# Patient Record
Sex: FEMALE | Race: WHITE | NOT HISPANIC OR LATINO | Employment: OTHER | URBAN - METROPOLITAN AREA
[De-identification: names, ages, dates, MRNs, and addresses within clinical notes are randomized per-mention and may not be internally consistent; named-entity substitution may affect disease eponyms.]

---

## 2017-12-21 ENCOUNTER — TRANSCRIBE ORDERS (OUTPATIENT)
Dept: OBSTETRICS AND GYNECOLOGY | Facility: CLINIC | Age: 67
End: 2017-12-21

## 2017-12-21 DIAGNOSIS — Z12.31 VISIT FOR SCREENING MAMMOGRAM: Primary | ICD-10-CM

## 2018-01-22 ENCOUNTER — HOSPITAL ENCOUNTER (OUTPATIENT)
Dept: MAMMOGRAPHY | Facility: HOSPITAL | Age: 68
Discharge: HOME | End: 2018-01-22
Attending: OBSTETRICS & GYNECOLOGY

## 2018-02-08 ENCOUNTER — HOSPITAL ENCOUNTER (OUTPATIENT)
Dept: MAMMOGRAPHY | Facility: HOSPITAL | Age: 68
Discharge: HOME | End: 2018-02-08
Attending: OBSTETRICS & GYNECOLOGY | Admitting: OBSTETRICS & GYNECOLOGY

## 2018-02-08 DIAGNOSIS — Z12.31 VISIT FOR SCREENING MAMMOGRAM: ICD-10-CM

## 2018-02-08 PROCEDURE — 77063 BREAST TOMOSYNTHESIS BI: CPT | Performed by: RADIOLOGY

## 2018-02-08 PROCEDURE — 77067 SCR MAMMO BI INCL CAD: CPT | Performed by: RADIOLOGY

## 2018-02-08 PROCEDURE — 77067 SCR MAMMO BI INCL CAD: CPT

## 2018-02-08 PROCEDURE — 77063 BREAST TOMOSYNTHESIS BI: CPT

## 2018-02-12 ENCOUNTER — HOSPITAL ENCOUNTER (OUTPATIENT)
Dept: MAMMOGRAPHY | Facility: HOSPITAL | Age: 68
Discharge: HOME | End: 2018-02-12
Admitting: OBSTETRICS & GYNECOLOGY

## 2018-02-12 ENCOUNTER — TRANSCRIBE ORDERS (OUTPATIENT)
Dept: MAMMOGRAPHY | Facility: HOSPITAL | Age: 68
End: 2018-02-12

## 2018-02-12 DIAGNOSIS — R92.8 ABNORMAL MAMMOGRAM: Primary | ICD-10-CM

## 2018-02-12 DIAGNOSIS — R92.8 ABNORMAL MAMMOGRAM: ICD-10-CM

## 2018-02-12 PROCEDURE — 77065 DX MAMMO INCL CAD UNI: CPT | Performed by: RADIOLOGY

## 2018-02-12 PROCEDURE — G0279 TOMOSYNTHESIS, MAMMO: HCPCS | Performed by: RADIOLOGY

## 2018-02-12 PROCEDURE — 77065 DX MAMMO INCL CAD UNI: CPT

## 2018-02-12 PROCEDURE — G0279 TOMOSYNTHESIS, MAMMO: HCPCS

## 2018-04-03 ENCOUNTER — OFFICE VISIT (OUTPATIENT)
Dept: OBSTETRICS AND GYNECOLOGY | Facility: CLINIC | Age: 68
End: 2018-04-03

## 2018-04-03 VITALS
SYSTOLIC BLOOD PRESSURE: 116 MMHG | BODY MASS INDEX: 31.41 KG/M2 | HEIGHT: 60 IN | WEIGHT: 160 LBS | DIASTOLIC BLOOD PRESSURE: 70 MMHG

## 2018-04-03 DIAGNOSIS — Z01.419 WOMEN'S ANNUAL ROUTINE GYNECOLOGICAL EXAMINATION: Primary | ICD-10-CM

## 2018-04-03 PROBLEM — Z98.890 HISTORY OF BACK SURGERY: Status: ACTIVE | Noted: 2018-04-03

## 2018-04-03 PROBLEM — Z82.62 FAMILY HISTORY OF OSTEOPOROSIS IN MOTHER: Status: ACTIVE | Noted: 2018-04-03

## 2018-04-03 PROBLEM — Z80.3 FAMILY HISTORY OF BREAST CANCER IN SISTER: Status: ACTIVE | Noted: 2018-04-03

## 2018-04-03 PROBLEM — Z90.710 HX OF ABDOMINAL HYSTERECTOMY: Status: ACTIVE | Noted: 2018-04-03

## 2018-04-03 PROBLEM — Z80.41 FAMILY HISTORY OF OVARIAN CANCER: Status: ACTIVE | Noted: 2018-04-03

## 2018-04-03 PROCEDURE — G0328 FECAL BLOOD SCRN IMMUNOASSAY: HCPCS | Performed by: OBSTETRICS & GYNECOLOGY

## 2018-04-03 PROCEDURE — G0101 CA SCREEN;PELVIC/BREAST EXAM: HCPCS | Performed by: OBSTETRICS & GYNECOLOGY

## 2018-04-03 RX ORDER — BUPROPION HYDROCHLORIDE 150 MG/1
150 TABLET ORAL DAILY
COMMUNITY
End: 2019-04-04

## 2018-04-03 RX ORDER — BISOPROLOL FUMARATE AND HYDROCHLOROTHIAZIDE 5; 6.25 MG/1; MG/1
TABLET ORAL
COMMUNITY
Start: 2018-02-14

## 2018-04-03 RX ORDER — LISINOPRIL 10 MG/1
TABLET ORAL
COMMUNITY
Start: 2018-02-14

## 2018-04-03 NOTE — PROGRESS NOTES
Subjective   Chief Complaint   Patient presents with   • Annual Exam     MARLYS Nassar is a 67 y.o. year old  menopausal female presenting to be seen for her annual exam.  There has not been vaginal bleeding in the last 12 months.  Hot flashes and night sweats are not a significant problem.  She seen Dr. Hoover in the past.  Her sister had breast and ovarian cancer in a proxy .  Kayleen underwent a complete hysterectomy in .  More recently she's had some pain in the lower abdomen thought was kidney stone because it passed almost immediately.  No blood in her urine.  She says both ovaries were removed.  Also issue with her breast on screening mammogram I reviewed this it showed an asymmetry follow-up showed the asymmetry improved.  She wonders if she still needs to go back for diagnostic mammogram.  In my opinion with asymmetry improving I would be less conservative than that mammograms center where they will check him back every 6 months for 2 years.  I personally think that of her mammogram does not change in 6 months that she could do yearly mammogram screening along with self breast examination.  She wants a good breast examination today.  SEXUAL Hx:  She is sexually active.  Vaginal dryness is a problem.She uses over-the-counter lubrication.   51 years.    HEALTH Hx:  She exercises regularly: yes.  She wears her seat belt:yes.  She has concerns about domestic violence: no.  She has noticed changes in height: no.              Calcium intake is not adequate she only drinks 1 serving of milk and 1 Green a day.  Her mother had osteoporosis was really bent over.  She reports [facility] in practice and will try to get those results.      The following portions of the patient's history were reviewed and updated as appropriate:problem list, current medications, allergies, past family history, past medical history, past  "social history and past surgical history.    Smoking status: Never Smoker                                                              Smokeless tobacco: Never Used                        Review of Systems   Her bowels and bladder are normal     Objective   /70   Ht 151.1 cm (59.5\")   Wt 72.6 kg (160 lb)   BMI 31.78 kg/m²      General:  well developed; well nourished  no acute distress  appears stated age   Skin:  No suspicious lesions seen   Thyroid: not examined   Breasts:  Examined in supine position  Symmetric without masses or skin dimpling  Nipples normal without inversion, lesions or discharge  There are no palpable axillary nodes   Abdomen: no umbilical or inginual hernias are present  no hepato-splenomegaly  Minimal tenderness lower abdomen near midline scar   Pelvis: External genitalia normal bimanual examination good support anteriorly i and the cuff no masses palpated nontender   Rectovaginal confirms above Hemoccult is negative         Assessment   1. Normal GYN examination status post hysterectomy with BSO   2. Abnormal mammogram only showing asymmetry which essentially resolved with spot compression views.  I withdrew the patient that it would be okay to return to us for her annual screening.  I warned her though at [facility] would require a diagnostic mammogram if she goes over the 6 month recommended timeframe.  3. Possible osteopenia.  Family history of osteoporosis in her mother     Plan   1. Calcium discussed  1200 mg daily in divided doses ideally in diet  2. Regular weight bearing exercise  3. Breast self awareness, mammograms discussed  4. Find paper chart and review DEXA scans to determine if osteopenia versus osteoporosis      New Medications Ordered This Visit   Medications   • bisoprolol-hydrochlorothiazide (ZIAC) 5-6.25 MG per tablet   • lisinopril (PRINIVIL,ZESTRIL) 10 MG tablet   • buPROPion XL (WELLBUTRIN XL) 150 MG 24 hr tablet     Sig: Take 150 mg by mouth Daily.       "     This note was electronically signed.    Amado Andrade M.D.  April 3, 2018

## 2018-04-17 PROBLEM — M85.89 OSTEOPENIA OF MULTIPLE SITES: Status: ACTIVE | Noted: 2018-04-17

## 2018-04-17 PROBLEM — D12.6 ADENOMATOUS POLYP OF COLON: Status: ACTIVE | Noted: 2018-04-17

## 2018-04-27 PROBLEM — M85.80 OSTEOPENIA DETERMINED BY X-RAY: Status: ACTIVE | Noted: 2018-04-27

## 2018-04-30 ENCOUNTER — TELEPHONE (OUTPATIENT)
Dept: OBSTETRICS AND GYNECOLOGY | Facility: CLINIC | Age: 68
End: 2018-04-30

## 2018-04-30 DIAGNOSIS — M85.89 OSTEOPENIA OF MULTIPLE SITES: Primary | ICD-10-CM

## 2018-04-30 NOTE — TELEPHONE ENCOUNTER
I left a message for Kayleen that I would like to order a bone density study follow-up.  I [facility], [facility] at 09.  This showed osteopenia.  She apparently was on Fosamax and discontinue this for Boniva because of high blood pressure.  I've been only medication in a while and would like to make sure that she is not regressing.  She had shown improvement on treatment.  We'll scan the third DEXA with inclusion of 05 & 03 results into media section

## 2018-08-14 ENCOUNTER — HOSPITAL ENCOUNTER (OUTPATIENT)
Dept: ULTRASOUND IMAGING | Facility: HOSPITAL | Age: 68
Discharge: HOME | End: 2018-08-14

## 2018-08-14 ENCOUNTER — HOSPITAL ENCOUNTER (OUTPATIENT)
Dept: MAMMOGRAPHY | Facility: HOSPITAL | Age: 68
Discharge: HOME | End: 2018-08-14

## 2018-08-14 ENCOUNTER — HOSPITAL ENCOUNTER (OUTPATIENT)
Dept: MAMMOGRAPHY | Facility: HOSPITAL | Age: 68
Discharge: HOME | End: 2018-08-14
Attending: OBSTETRICS & GYNECOLOGY | Admitting: OBSTETRICS & GYNECOLOGY

## 2018-08-14 DIAGNOSIS — R92.8 ABNORMAL MAMMOGRAM: ICD-10-CM

## 2018-08-14 PROCEDURE — A4648 IMPLANTABLE TISSUE MARKER: HCPCS

## 2018-08-14 PROCEDURE — 76642 ULTRASOUND BREAST LIMITED: CPT

## 2018-08-14 PROCEDURE — 77065 DX MAMMO INCL CAD UNI: CPT | Performed by: RADIOLOGY

## 2018-08-14 PROCEDURE — 76642 ULTRASOUND BREAST LIMITED: CPT | Performed by: RADIOLOGY

## 2018-08-14 PROCEDURE — 19083 BX BREAST 1ST LESION US IMAG: CPT | Performed by: RADIOLOGY

## 2018-08-14 PROCEDURE — 88305 TISSUE EXAM BY PATHOLOGIST: CPT | Performed by: OBSTETRICS & GYNECOLOGY

## 2018-08-14 PROCEDURE — 77065 DX MAMMO INCL CAD UNI: CPT

## 2018-08-14 RX ORDER — LIDOCAINE HYDROCHLORIDE AND EPINEPHRINE 10; 10 MG/ML; UG/ML
5 INJECTION, SOLUTION INFILTRATION; PERINEURAL ONCE
Status: DISCONTINUED | OUTPATIENT
Start: 2018-08-14 | End: 2018-09-12

## 2018-08-14 RX ORDER — LIDOCAINE HYDROCHLORIDE 10 MG/ML
5 INJECTION, SOLUTION INFILTRATION; PERINEURAL ONCE
Status: COMPLETED | OUTPATIENT
Start: 2018-08-14 | End: 2018-08-14

## 2018-08-14 RX ADMIN — LIDOCAINE HYDROCHLORIDE 5 ML: 10 INJECTION, SOLUTION INFILTRATION; PERINEURAL at 12:05

## 2018-08-15 LAB
CYTO UR: NORMAL
LAB AP CASE REPORT: NORMAL
LAB AP CLINICAL INFORMATION: NORMAL
LAB AP DIAGNOSIS COMMENT: NORMAL
PATH REPORT.FINAL DX SPEC: NORMAL
PATH REPORT.GROSS SPEC: NORMAL

## 2018-08-20 ENCOUNTER — TRANSCRIBE ORDERS (OUTPATIENT)
Dept: MAMMOGRAPHY | Facility: HOSPITAL | Age: 68
End: 2018-08-20

## 2018-08-20 DIAGNOSIS — R92.8 ABNORMAL MAMMOGRAM: Primary | ICD-10-CM

## 2018-09-12 ENCOUNTER — HOSPITAL ENCOUNTER (OUTPATIENT)
Dept: MAMMOGRAPHY | Facility: HOSPITAL | Age: 68
Discharge: HOME | End: 2018-09-12

## 2018-09-12 ENCOUNTER — HOSPITAL ENCOUNTER (OUTPATIENT)
Dept: MAMMOGRAPHY | Facility: HOSPITAL | Age: 68
Discharge: HOME | End: 2018-09-12
Admitting: RADIOLOGY

## 2018-09-12 DIAGNOSIS — R92.8 ABNORMAL MAMMOGRAM: ICD-10-CM

## 2018-09-12 PROCEDURE — A4648 IMPLANTABLE TISSUE MARKER: HCPCS

## 2018-09-12 PROCEDURE — 19081 BX BREAST 1ST LESION STRTCTC: CPT | Performed by: RADIOLOGY

## 2018-09-12 PROCEDURE — 88360 TUMOR IMMUNOHISTOCHEM/MANUAL: CPT | Performed by: OBSTETRICS & GYNECOLOGY

## 2018-09-12 PROCEDURE — 88305 TISSUE EXAM BY PATHOLOGIST: CPT | Performed by: OBSTETRICS & GYNECOLOGY

## 2018-09-12 PROCEDURE — 77065 DX MAMMO INCL CAD UNI: CPT | Performed by: RADIOLOGY

## 2018-09-12 RX ORDER — LIDOCAINE HYDROCHLORIDE 10 MG/ML
5 INJECTION, SOLUTION INFILTRATION; PERINEURAL ONCE
Status: COMPLETED | OUTPATIENT
Start: 2018-09-12 | End: 2018-09-12

## 2018-09-12 RX ORDER — LIDOCAINE HYDROCHLORIDE AND EPINEPHRINE 10; 10 MG/ML; UG/ML
10 INJECTION, SOLUTION INFILTRATION; PERINEURAL ONCE
Status: COMPLETED | OUTPATIENT
Start: 2018-09-12 | End: 2018-09-12

## 2018-09-12 RX ADMIN — LIDOCAINE HYDROCHLORIDE,EPINEPHRINE BITARTRATE 18 ML: 10; .01 INJECTION, SOLUTION INFILTRATION; PERINEURAL at 09:46

## 2018-09-12 RX ADMIN — LIDOCAINE HYDROCHLORIDE 5 ML: 10 INJECTION, SOLUTION INFILTRATION; PERINEURAL at 09:46

## 2018-09-14 LAB
CYTO UR: NORMAL
LAB AP CASE REPORT: NORMAL
LAB AP CLINICAL INFORMATION: NORMAL
LAB AP DIAGNOSIS COMMENT: NORMAL
LAB AP FLOW CYTOMETRY SUMMARY: NORMAL
LAB AP SPECIAL STAINS: NORMAL
PATH REPORT.FINAL DX SPEC: NORMAL
PATH REPORT.GROSS SPEC: NORMAL

## 2018-09-17 ENCOUNTER — TELEPHONE (OUTPATIENT)
Dept: MAMMOGRAPHY | Facility: HOSPITAL | Age: 68
End: 2018-09-17

## 2018-09-17 ENCOUNTER — TELEPHONE (OUTPATIENT)
Dept: OBSTETRICS AND GYNECOLOGY | Facility: CLINIC | Age: 68
End: 2018-09-17

## 2018-09-17 ENCOUNTER — DOCUMENTATION (OUTPATIENT)
Dept: OBSTETRICS AND GYNECOLOGY | Facility: CLINIC | Age: 68
End: 2018-09-17

## 2018-09-17 NOTE — PROGRESS NOTES
I called Kayleen this morning having thought that the radiology Department may have told her about her path report from Wednesday today being Monday the .  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  She wanted to know the size and I've stated that the size on the ultrasound and mammograms just under 1 cm.  2 and half centimeters ring 1 inch.  Suggested that she see general surgery.  Gave her the name of Dr. Jung toussaint and office #[id number].  She wondered what would happen if she didn't do anything.  She had a sister who was diagnosed with breast cancer at age 50 and had multiple surgeries and  at age 53.  She questioned her sister's quality of life during that treatment.  I discussed that all cancers are not the same and her sister may have had a different diagnosis or more aggressive cancer.  Did briefly discuss at her cancer is estrogen and progesterone positive so antiestrogen medications could be prescribed.  Strongly suggested that she make an appointment to discuss her options with Gen. surgery.  Told her to give me a call back if she had any questions.  Dr. Andrade

## 2018-09-17 NOTE — TELEPHONE ENCOUNTER
Referring provider's office notified pathology returned as cancer & patient will be notified. Pt notified of pathology results and recommendation. Verbalizes understanding. Denies discomfort. Denies any signs and symptoms of infection. Patient desires Dr MARTELL Snyder for surgical consult. Patient will be notified of appointment.  Patient was encouraged to call back with any questions or concerns. Patient verbalized understanding. Breast cancer information packet offered and accepted.

## 2018-09-17 NOTE — PROGRESS NOTES
Sorry about Dragon dictation  as well -that's stepping in front of you.  I thought maybe you got in touch with  last week

## 2018-09-17 NOTE — PROGRESS NOTES
I apologize for stepping in front of view.  I thought that maybe it got in touch with Kayleen last week.  I relayed the diagnosis to her and she was thankful that I told her.  I've left and suggested she call Dr. Snyder's office to see him or one of his partners for options.

## 2018-10-08 ENCOUNTER — CLINICAL SUPPORT (OUTPATIENT)
Dept: GENETICS | Facility: HOSPITAL | Age: 68
End: 2018-10-08

## 2018-10-08 ENCOUNTER — APPOINTMENT (OUTPATIENT)
Dept: LAB | Facility: HOSPITAL | Age: 68
End: 2018-10-08

## 2018-10-08 DIAGNOSIS — Z80.49 FAMILY HISTORY OF UTERINE CANCER: ICD-10-CM

## 2018-10-08 DIAGNOSIS — D05.12 DUCTAL CARCINOMA IN SITU (DCIS) OF LEFT BREAST: Primary | ICD-10-CM

## 2018-10-08 DIAGNOSIS — Z80.3 FAMILY HISTORY OF BREAST CANCER: ICD-10-CM

## 2018-10-08 DIAGNOSIS — Z80.0 FAMILY HISTORY OF COLON CANCER: ICD-10-CM

## 2018-10-08 DIAGNOSIS — Z80.42 FAMILY HX OF PROSTATE CANCER: ICD-10-CM

## 2018-10-08 DIAGNOSIS — Z13.79 GENETIC TESTING: Primary | ICD-10-CM

## 2018-10-08 PROCEDURE — 96040: CPT | Performed by: GENETIC COUNSELOR, MS

## 2018-10-10 NOTE — PROGRESS NOTES
Kayleen Nassar is a 68-year old female who was seen for genetic counseling due to a personal history of breast cancer and family history of cancer. 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 She is in the process of making a surgical decision. She has had a hysterectomy but her ovaries are still intact. Ms. Nassar was interested in discussing her risk for a hereditary cancer syndrome, and decided to pursue genetic testing.   Ms. Nassar opted to pursue comprehensive testing via the CancerNext panel ordered through [employer] which includes 34 genes that are known to increase the risk of breast cancer and other cancers (genes on this panel include APC, NATALIE, BARD1, BMPR1A, BRCA1, BRCA2, BRIP1, CDH1, CDK4, CDKN2A, CHEK2, EPCAM, GREM1, MLH1, MRE11A, MSH2, MSH6, MUTYH, NBN, NF1, PALB2, PMS2, POLD1, POLE, PTEN, RAD50, RAD51C, RAD51D, SMAD4, SMARCA4, STK11, and TP53). Results are expected in 2-3 weeks.    PERTINENT FAMILY HISTORY: (See pedigree)   Sister:   Breast cancer (bilateral), 50     Uterine cancer, 53  Pat. Uncle:  Colon cancer, 70s  Pat. Aunt:  Breast cancer, 30s  Pat. Aunt:  Small bowel cancer, 60s  Pat. Cousin (x2): Breast cancer, 60s  Pat. Cousin (x2): Prostate cancer, 60s  Pat. Cousin:  Ovarian cancer, 69  Mat. Uncle:  Colon cancer, 80  Mat. Cousin (x2): Colon cancer, 60s  Mat. Aunt:  Colon cancer, 70  Mat. Grandfather: Colon cancer      RISK ASSESSMENT:  Ms. Nassar’s personal history of breast cancer in addition to the significant family history of colon and breast cancer raises the question of a hereditary cancer syndrome. We discussed BRCA1/2 testing as well as the option of pursuing a panel that would test for other genes known to impact breast cancer risk in addition to BRCA1/2.  We also discussed more comprehensive panels available based on the significant history of colon cancer in the family. This risk assessment is based on the family history information provided at the time of the  appointment.  The assessment could change in the future should new information be obtained.    GENETIC COUNSELING (40 minutes): We reviewed the family history information in detail.  Cases of breast cancer follow three general patterns: sporadic, familial, and hereditary. While most cancer is sporadic, some cases appear to occur in family clusters.  These cases are said to be familial and account for 10-20% of breast cancer   cases. Familial cases may be due to a combination of shared genes and environmental factors among family members. In even fewer families, the cancer is said to be inherited, and the genes responsible for the cancer are known.      Family histories typical of hereditary cancer syndromes usually include multiple first- and second-degree relatives diagnosed with cancer types that define a syndrome. These cases tend to be diagnosed at younger-than-expected ages and can be bilateral or multifocal. The cancer in these families follows an autosomal dominant inheritance pattern, which indicates the likely presence of a mutation in a cancer susceptibility gene. Children and siblings of an individual believed to carry this mutation have a 50% chance of inheriting that mutation, thereby inheriting the increased risk to develop cancer.  These mutations can be passed down from the maternal or the paternal lineage.    Hereditary breast cancer accounts for 5-10% of all cases of breast cancer.  A significant proportion of hereditary breast cancer can be attributed to mutations in the BRCA1 and BRCA2 genes.  Mutations in these genes confer an increased risk for breast cancer, ovarian cancer, male breast cancer, prostate cancer and pancreatic cancer.  Women with a BRCA1 or BRCA2 mutation who have already been diagnosed with breast cancer have a 40-60% lifetime risk of a second breast cancer.     There are other genes that are known to be associated with an increased risk for cancer.  Some of these genes have  well defined cancer risks and established management guidelines.  Other genes that can be tested for have been more recently described, and there may be less data regarding the risks and therefore may not have established management guidelines.  Based on Ms. Nassar’s desire to get as much information as possible regarding her personal risks and potential risks for her family, she opted to pursue testing through a panel that would look at 34 genes known to increase the risk for breast cancer and other cancers.    Genetic Testing:  The risks, benefits and limitations of genetic testing and implications for clinical management following testing were reviewed.  DNA test results can influence decisions regarding screening, prevention and surgical management.  Genetic testing can have significant psychological implications for both individuals and families.  Also discussed was the possibility of employment and insurance discrimination based on genetic test results and the laws in place to prevent this ([place]IL), as well as the limitations of these laws.    We discussed panel testing, which would involve testing for BRCA1/2 as well as 32 other genes at the same time (APC, NATALIE, BARD1, BMPR1A, BRCA1, BRCA2, BRIP1, CDH1, CDK4, CDKN2A, CHEK2, EPCAM, GREM1, MLH1, MRE11A, MSH2, MSH6, MUTYH, NBN, NF1, PALB2, PMS2, POLD1, POLE, PTEN, RAD50, RAD51C, RAD51D, SMAD4, SMARCA4, STK11, and TP53). The benefits and limitations of genetic testing were discussed and Ms. Nassar decided to pursue testing via the panel. The implications of a positive or negative test result were discussed. We discussed the possibility that, in some cases, genetic test results may be ambiguous due to the identification of a genetic variant. These variants may or may not be associated with an increased cancer risk. Given her personal history, a negative test result does not eliminate all breast cancer risk to her relatives, although the risk would not be as high  as it would with positive genetic testing.        PLAN: Genetic testing should be complete in 2-3 weeks, and Ms. Nassar will be contacted by telephone to discuss the results. In the meantime Ms. Nassar is welcome to contact us at [phone] with any questions she may have.        Nicolette Arellano MS, [facility] [place] [facility]       Licensed Certified Genetic Counselor

## 2018-10-16 ENCOUNTER — DOCUMENTATION (OUTPATIENT)
Dept: GENETICS | Facility: HOSPITAL | Age: 68
End: 2018-10-16

## 2018-10-16 NOTE — PROGRESS NOTES
Kayleen Nassar is a 68-year old female who was seen for genetic counseling due to a personal history of breast cancer and family history of cancer. 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 She is in the process of making a surgical decision. She has had a hysterectomy but her ovaries are still intact. Ms. Nassar was interested in discussing her risk for a hereditary cancer syndrome, and decided to pursue genetic testing.   Ms. Nassar opted to pursue comprehensive testing via the CancerNext panel ordered through [employer] which includes 34 genes that are known to increase the risk of breast cancer and other cancers (genes on this panel include APC, NATALIE, BARD1, BMPR1A, BRCA1, BRCA2, BRIP1, CDH1, CDK4, CDKN2A, CHEK2, EPCAM, GREM1, MLH1, MRE11A, MSH2, MSH6, MUTYH, NBN, NF1, PALB2, PMS2, POLD1, POLE, PTEN, RAD50, RAD51C, RAD51D, SMAD4, SMARCA4, STK11, and TP53). Genetic testing was negative for mutations in BRCA1/2 and all 32 additional genes on the CancerNext Panel.  Ms. Nassar was contacted with these normal results by telephone on 10/16/18.    PERTINENT FAMILY HISTORY: (See pedigree)   Sister:   Breast cancer (bilateral), 50     Uterine cancer, 53  Pat. Uncle:  Colon cancer, 70s  Pat. Aunt:  Breast cancer, 30s  Pat. Aunt:  Small bowel cancer, 60s  Pat. Cousin (x2): Breast cancer, 60s  Pat. Cousin (x2): Prostate cancer, 60s  Pat. Cousin:  Ovarian cancer, 69  Mat. Uncle:  Colon cancer, 80  Mat. Cousin (x2): Colon cancer, 60s  Mat. Aunt:  Colon cancer, 70  Mat. Grandfather: Colon cancer      RISK ASSESSMENT:  Ms. Nassar’s personal history of breast cancer in addition to the significant family history of colon and breast cancer raises the question of a hereditary cancer syndrome. We discussed BRCA1/2 testing as well as the option of pursuing a panel that would test for other genes known to impact breast cancer risk in addition to BRCA1/2.  We also discussed more comprehensive panels available based on the  significant history of colon cancer in the family. This risk assessment is based on the family history information provided at the time of the appointment.  The assessment could change in the future should new information be obtained.    GENETIC COUNSELING: We reviewed the family history information in detail.  Cases of breast cancer follow three general patterns: sporadic, familial, and hereditary. While most cancer is sporadic, some cases appear to occur in family clusters.  These cases are said to be familial and account for 10-20% of breast cancer   cases. Familial cases may be due to a combination of shared genes and environmental factors among family members. In even fewer families, the cancer is said to be inherited, and the genes responsible for the cancer are known.      Family histories typical of hereditary cancer syndromes usually include multiple first- and second-degree relatives diagnosed with cancer types that define a syndrome. These cases tend to be diagnosed at younger-than-expected ages and can be bilateral or multifocal. The cancer in these families follows an autosomal dominant inheritance pattern, which indicates the likely presence of a mutation in a cancer susceptibility gene. Children and siblings of an individual believed to carry this mutation have a 50% chance of inheriting that mutation, thereby inheriting the increased risk to develop cancer.  These mutations can be passed down from the maternal or the paternal lineage.    Hereditary breast cancer accounts for 5-10% of all cases of breast cancer.  A significant proportion of hereditary breast cancer can be attributed to mutations in the BRCA1 and BRCA2 genes.  Mutations in these genes confer an increased risk for breast cancer, ovarian cancer, male breast cancer, prostate cancer and pancreatic cancer.  Women with a BRCA1 or BRCA2 mutation who have already been diagnosed with breast cancer have a 40-60% lifetime risk of a second breast  cancer.     There are other genes that are known to be associated with an increased risk for cancer.  Some of these genes have well defined cancer risks and established management guidelines.  Other genes that can be tested for have been more recently described, and there may be less data regarding the risks and therefore may not have established management guidelines.  Based on Ms. Nassar’s desire to get as much information as possible regarding her personal risks and potential risks for her family, she opted to pursue testing through a panel that would look at 34 genes known to increase the risk for breast cancer and other cancers.    Genetic Testing:  The risks, benefits and limitations of genetic testing and implications for clinical management following testing were reviewed.  DNA test results can influence decisions regarding screening, prevention and surgical management.  Genetic testing can have significant psychological implications for both individuals and families.  Also discussed was the possibility of employment and insurance discrimination based on genetic test results and the laws in place to prevent this (ONEIL), as well as the limitations of these laws.    We discussed panel testing, which would involve testing for BRCA1/2 as well as 32 other genes at the same time (APC, NATALIE, BARD1, BMPR1A, BRCA1, BRCA2, BRIP1, CDH1, CDK4, CDKN2A, CHEK2, EPCAM, GREM1, MLH1, MRE11A, MSH2, MSH6, MUTYH, NBN, NF1, PALB2, PMS2, POLD1, POLE, PTEN, RAD50, RAD51C, RAD51D, SMAD4, SMARCA4, STK11, and TP53). The benefits and limitations of genetic testing were discussed and Ms. Nassar decided to pursue testing via the panel. The implications of a positive or negative test result were discussed. We discussed the possibility that, in some cases, genetic test results may be ambiguous due to the identification of a genetic variant. These variants may or may not be associated with an increased cancer risk. Given her personal  history, a negative test result does not eliminate all breast cancer risk to her relatives, although the risk would not be as high as it would with positive genetic testing.      TEST RESULTS:  Genetic testing was negative by sequencing and deletion/duplication testing for mutations in BRCA1/2 and the additional 32 genes on the CancerNext panel.   The negative result greatly lowers the risk of a hereditary cancer syndrome.   Ms. Nassar’s relatives may still be considered to have a somewhat increased risk for breast cancer based on the family history.  This assessment is based on the information provided at the time of the consultation.    Cancer Prevention:  Despite the negative genetic test results, Ms. Nassar’s female relatives may have a somewhat increased lifetime risk for breast cancer based on family history.  Given the increased risk, options available to individuals with a high lifetime risk for breast cancer were briefly discussed.  This includes increased surveillance and chemoprevention.     Surveillance for individuals with a high lifetime risk of breast cancer (>20%, versus the average risk of 12%), based on NCCN guidelines, would consist of semi-annual clinical breast exams and monthly self-breast exams starting by age 18 and annual mammography starting 10 years younger than the earliest diagnosis in a close relative, or starting by age 40.  According to an American Cancer Society expert panel, annual breast MRI should be offered to women whose lifetime risk of breast cancer is 20-25 percent or more, also starting by age 40 or earlier if indicated by family history.  Female relatives could have a risk assessment performed using a family history-based model, such as the Tyrer-Cuzick model, to determine their individual risks.      PLAN:  Genetic counseling remains available for Ms. Nassar.  If Ms. Nassar has any questions or concerns she is welcome to call us at [phone].    Nicolette Arellano MS,  [facility] [place] [facility]       Licensed Certified Genetic Counselor          Cc: Kayleen Snyder MD

## 2018-10-31 ENCOUNTER — TRANSCRIBE ORDERS (OUTPATIENT)
Dept: MAMMOGRAPHY | Facility: HOSPITAL | Age: 68
End: 2018-10-31

## 2018-10-31 DIAGNOSIS — D05.12 DUCTAL CARCINOMA IN SITU OF LEFT BREAST: Primary | ICD-10-CM

## 2018-11-07 ENCOUNTER — APPOINTMENT (OUTPATIENT)
Dept: PREADMISSION TESTING | Facility: HOSPITAL | Age: 68
End: 2018-11-07

## 2018-11-07 LAB
ANION GAP SERPL CALCULATED.3IONS-SCNC: 9 MMOL/L (ref 3–11)
BUN BLD-MCNC: 24 MG/DL (ref 9–23)
BUN/CREAT SERPL: 20.2 (ref 7–25)
CALCIUM SPEC-SCNC: 9.4 MG/DL (ref 8.7–10.4)
CHLORIDE SERPL-SCNC: 102 MMOL/L (ref 99–109)
CO2 SERPL-SCNC: 26 MMOL/L (ref 20–31)
CREAT BLD-MCNC: 1.19 MG/DL (ref 0.6–1.3)
DEPRECATED RDW RBC AUTO: 43 FL (ref 37–54)
ERYTHROCYTE [DISTWIDTH] IN BLOOD BY AUTOMATED COUNT: 12.7 % (ref 11.3–14.5)
GFR SERPL CREATININE-BSD FRML MDRD: 45 ML/MIN/1.73
GLUCOSE BLD-MCNC: 102 MG/DL (ref 70–100)
HCT VFR BLD AUTO: 42.5 % (ref 34.5–44)
HGB BLD-MCNC: 13.9 G/DL (ref 11.5–15.5)
MCH RBC QN AUTO: 30.2 PG (ref 27–31)
MCHC RBC AUTO-ENTMCNC: 32.7 G/DL (ref 32–36)
MCV RBC AUTO: 92.2 FL (ref 80–99)
PLATELET # BLD AUTO: 330 10*3/MM3 (ref 150–450)
PMV BLD AUTO: 9.4 FL (ref 6–12)
POTASSIUM BLD-SCNC: 4.4 MMOL/L (ref 3.5–5.5)
RBC # BLD AUTO: 4.61 10*6/MM3 (ref 3.89–5.14)
SODIUM BLD-SCNC: 137 MMOL/L (ref 132–146)
WBC NRBC COR # BLD: 9.21 10*3/MM3 (ref 3.5–10.8)

## 2018-11-07 PROCEDURE — 36415 COLL VENOUS BLD VENIPUNCTURE: CPT

## 2018-11-07 PROCEDURE — 93010 ELECTROCARDIOGRAM REPORT: CPT | Performed by: INTERNAL MEDICINE

## 2018-11-07 PROCEDURE — 80048 BASIC METABOLIC PNL TOTAL CA: CPT | Performed by: SURGERY

## 2018-11-07 PROCEDURE — 85027 COMPLETE CBC AUTOMATED: CPT | Performed by: SURGERY

## 2018-11-07 PROCEDURE — 93005 ELECTROCARDIOGRAM TRACING: CPT

## 2018-11-08 ENCOUNTER — HOSPITAL ENCOUNTER (OUTPATIENT)
Dept: MAMMOGRAPHY | Facility: HOSPITAL | Age: 68
Discharge: HOME | End: 2018-11-08
Attending: SURGERY | Admitting: SURGERY

## 2018-11-08 ENCOUNTER — HOSPITAL ENCOUNTER (OUTPATIENT)
Dept: MAMMOGRAPHY | Facility: HOSPITAL | Age: 68
Discharge: HOME | End: 2018-11-08

## 2018-11-08 ENCOUNTER — HOSPITAL ENCOUNTER (OUTPATIENT)
Dept: MAMMOGRAPHY | Facility: HOSPITAL | Age: 68
Discharge: HOME | End: 2018-11-08
Attending: SURGERY

## 2018-11-08 ENCOUNTER — LAB REQUISITION (OUTPATIENT)
Dept: LAB | Facility: HOSPITAL | Age: 68
End: 2018-11-08

## 2018-11-08 DIAGNOSIS — D05.12 DUCTAL CARCINOMA IN SITU OF LEFT BREAST: ICD-10-CM

## 2018-11-08 DIAGNOSIS — D05.12 INTRADUCTAL CARCINOMA IN SITU OF LEFT BREAST: ICD-10-CM

## 2018-11-08 PROCEDURE — 76098 X-RAY EXAM SURGICAL SPECIMEN: CPT

## 2018-11-08 PROCEDURE — 77065 DX MAMMO INCL CAD UNI: CPT | Performed by: RADIOLOGY

## 2018-11-08 PROCEDURE — 76098 X-RAY EXAM SURGICAL SPECIMEN: CPT | Performed by: RADIOLOGY

## 2018-11-08 PROCEDURE — 88360 TUMOR IMMUNOHISTOCHEM/MANUAL: CPT | Performed by: SURGERY

## 2018-11-08 PROCEDURE — 88307 TISSUE EXAM BY PATHOLOGIST: CPT | Performed by: SURGERY

## 2018-11-08 PROCEDURE — 88341 IMHCHEM/IMCYTCHM EA ADD ANTB: CPT | Performed by: SURGERY

## 2018-11-08 PROCEDURE — 19283 PERQ DEV BREAST 1ST STRTCTC: CPT | Performed by: RADIOLOGY

## 2018-11-08 PROCEDURE — 88342 IMHCHEM/IMCYTCHM 1ST ANTB: CPT | Performed by: SURGERY

## 2018-11-08 RX ORDER — LIDOCAINE HYDROCHLORIDE 10 MG/ML
5 INJECTION, SOLUTION INFILTRATION; PERINEURAL ONCE
Status: COMPLETED | OUTPATIENT
Start: 2018-11-08 | End: 2018-11-08

## 2018-11-08 RX ADMIN — LIDOCAINE HYDROCHLORIDE 5 ML: 10 INJECTION, SOLUTION INFILTRATION; PERINEURAL at 09:10

## 2018-11-08 RX ADMIN — LIDOCAINE HYDROCHLORIDE 5 ML: 10 INJECTION, SOLUTION INFILTRATION; PERINEURAL at 09:05

## 2018-11-15 LAB
CYTO UR: NORMAL
LAB AP CASE REPORT: NORMAL
LAB AP CLINICAL INFORMATION: NORMAL
LAB AP SPECIAL STAINS: NORMAL
PATH REPORT.FINAL DX SPEC: NORMAL
PATH REPORT.GROSS SPEC: NORMAL

## 2018-12-03 ENCOUNTER — TRANSCRIBE ORDERS (OUTPATIENT)
Dept: ADMINISTRATIVE | Facility: HOSPITAL | Age: 68
End: 2018-12-03

## 2018-12-03 DIAGNOSIS — C50.212 MALIGNANT NEOPLASM OF UPPER-INNER QUADRANT OF LEFT FEMALE BREAST, UNSPECIFIED ESTROGEN RECEPTOR STATUS (HCC): Primary | ICD-10-CM

## 2018-12-06 ENCOUNTER — LAB REQUISITION (OUTPATIENT)
Dept: LAB | Facility: HOSPITAL | Age: 68
End: 2018-12-06

## 2018-12-06 ENCOUNTER — HOSPITAL ENCOUNTER (OUTPATIENT)
Dept: NUCLEAR MEDICINE | Facility: HOSPITAL | Age: 68
Discharge: HOME | End: 2018-12-06
Attending: SURGERY

## 2018-12-06 DIAGNOSIS — C50.212 MALIGNANT NEOPLASM OF UPPER-INNER QUADRANT OF LEFT FEMALE BREAST, UNSPECIFIED ESTROGEN RECEPTOR STATUS (HCC): ICD-10-CM

## 2018-12-06 DIAGNOSIS — C50.212 MALIGNANT NEOPLASM OF UPPER-INNER QUADRANT OF LEFT FEMALE BREAST (HCC): ICD-10-CM

## 2018-12-06 LAB — POTASSIUM BLDA-SCNC: 4.16 MMOL/L (ref 3.5–5.3)

## 2018-12-06 PROCEDURE — A9541 TC99M SULFUR COLLOID: HCPCS | Performed by: SURGERY

## 2018-12-06 PROCEDURE — 38792 RA TRACER ID OF SENTINL NODE: CPT

## 2018-12-06 PROCEDURE — 88307 TISSUE EXAM BY PATHOLOGIST: CPT | Performed by: SURGERY

## 2018-12-06 PROCEDURE — 84132 ASSAY OF SERUM POTASSIUM: CPT | Performed by: SURGERY

## 2018-12-06 PROCEDURE — 0 TECHNETIUM FILTERED SULFUR COLLOID: Performed by: SURGERY

## 2018-12-06 RX ADMIN — TECHNETIUM TC 99M SULFUR COLLOID 1 DOSE: KIT at 14:55

## 2018-12-13 LAB
CYTO UR: NORMAL
LAB AP CASE REPORT: NORMAL
LAB AP CLINICAL INFORMATION: NORMAL
PATH REPORT.FINAL DX SPEC: NORMAL
PATH REPORT.GROSS SPEC: NORMAL

## 2018-12-16 PROBLEM — Z85.3 HISTORY OF LEFT BREAST CANCER: Status: ACTIVE | Noted: 2018-12-16

## 2018-12-18 ENCOUNTER — TELEPHONE (OUTPATIENT)
Dept: OBSTETRICS AND GYNECOLOGY | Facility: CLINIC | Age: 68
End: 2018-12-18

## 2019-01-28 ENCOUNTER — OFFICE VISIT (OUTPATIENT)
Dept: RADIATION ONCOLOGY | Facility: HOSPITAL | Age: 69
End: 2019-01-28

## 2019-01-28 ENCOUNTER — HOSPITAL ENCOUNTER (OUTPATIENT)
Dept: RADIATION ONCOLOGY | Facility: HOSPITAL | Age: 69
Setting detail: RADIATION/ONCOLOGY SERIES
Discharge: HOME | End: 2019-01-28

## 2019-01-28 VITALS
HEIGHT: 60 IN | HEART RATE: 78 BPM | WEIGHT: 157 LBS | TEMPERATURE: 98.3 F | SYSTOLIC BLOOD PRESSURE: 124 MMHG | RESPIRATION RATE: 16 BRPM | BODY MASS INDEX: 30.82 KG/M2 | DIASTOLIC BLOOD PRESSURE: 80 MMHG

## 2019-01-28 DIAGNOSIS — Z17.0 MALIGNANT NEOPLASM OF UPPER-INNER QUADRANT OF LEFT BREAST IN FEMALE, ESTROGEN RECEPTOR POSITIVE (HCC): Primary | ICD-10-CM

## 2019-01-28 DIAGNOSIS — C50.212 MALIGNANT NEOPLASM OF UPPER-INNER QUADRANT OF LEFT BREAST IN FEMALE, ESTROGEN RECEPTOR POSITIVE (HCC): Primary | ICD-10-CM

## 2019-01-28 PROCEDURE — G0463 HOSPITAL OUTPT CLINIC VISIT: HCPCS

## 2019-01-28 RX ORDER — TAMOXIFEN CITRATE 20 MG/1
20 TABLET ORAL DAILY
COMMUNITY
Start: 2019-01-09 | End: 2020-08-11 | Stop reason: SDUPTHER

## 2019-01-28 NOTE — PROGRESS NOTES
CONSULTATION NOTE    NAME:      Kayleen Nassar  :                                                          1950  DATE OF CONSULTATION:                       2019   REQUESTING PHYSICIAN:                   Jung RAGSDALE MD  REASON FOR CONSULTATION:           Cancer Staging  Malignant neoplasm of upper-inner quadrant of left breast in female, estrogen receptor positive (CMS/HCC)  Staging form: Breast, AJCC 8th Edition  - Clinical: No stage assigned - Unsigned  - Pathologic stage from 2019: Stage IA (pT1b, pN0, cM0, G2, ER: Positive, TX: Positive, HER2: Negative) - Signed by Izabella Tavarez MD on 2019           BRIEF HISTORY:  Kayleen Nassar  is a very pleasant 68 y.o. female  who had an asymmetry in the the left breast at the 12 o'clock position.  On ultrasound the mass was confirmed at the 11 to 12 o'clock position.  Biopsy was negative but she went on to left breast needle localization and excision.  It was positive for infiltrating intermediate grade ductal adenocarcinoma, tumor size 6 x 3 x 3 mm, Gomez Ryan score 6/9.  Margins were negative by 2.5 mm.  There was no lymphovascular invasion.  The tumor was ER/TX positive HER-2/agnieszka negative.  And a separate procedure she had left Cunningham node sampling and 0/2 nodes were positive for tumor.  The patient was described tamoxifen per Dr. Snyder and now has questions concerning side effects.  She is from [place] and is here to discuss postoperative radiotherapy.  She has no complaints.  She has hesitation regarding treatment.  Her sister  of breast cancer 4 years after diagnosis and despite chemotherapy and tamoxifen.        Social History     Tobacco Use   • Smoking status: Never Smoker   • Smokeless tobacco: Never Used   Substance Use Topics   • Alcohol use: No   • Drug use: No         Past Medical History:   Diagnosis Date   • Chronic kidney disease    • Hyperlipidemia        family history includes Breast cancer (age of onset: 50)  "in her sister; Ovarian cancer (age of onset: 52) in her sister.     Past Surgical History:   Procedure Laterality Date   • BACK SURGERY      times 2   • HYSTERECTOMY  2001   • OOPHORECTOMY Bilateral 2001        Review of Systems   All other systems reviewed and are negative.          Objective   VITAL SIGNS:   Vitals:    01/28/19 1038   BP: 124/80   Pulse: 78   Resp: 16   Temp: 98.3 °F (36.8 °C)   Weight: 71.2 kg (157 lb)   Height: 151.1 cm (59.5\")   PainSc: 0-No pain        KPS       90%    Physical Exam   Constitutional: She is oriented to person, place, and time. She appears well-developed and well-nourished. No distress.   HENT:   Head: Normocephalic and atraumatic.   Eyes: EOM are normal. No scleral icterus.   Neck: Neck supple.   Cardiovascular: Normal rate and regular rhythm.   Pulmonary/Chest: Effort normal and breath sounds normal.   Lymphadenopathy:     She has no cervical adenopathy.   Neurological: She is alert and oriented to person, place, and time.   Nursing note and vitals reviewed.  The breast exam revealed the patient has symmetric breasts.  The right breast has no masses, suspicious lesions, skin changes, or nipple discharge.  The left breast has a well-healed surgical incision at the 11 to 12 o'clock position and in the left axilla.  She has no masses or suspicious lesions of the left breast and no axillary adenopathy bilaterally.           The following portions of the patient's history were reviewed and updated as appropriate: allergies, current medications, past family history, past medical history, past social history, past surgical history and problem list.    Assessment      IMPRESSION:  Mrs. Nassar has a small early stage breast cancer that is intermediate grade.  She had negative margins and its ER/AZ positive HER-2/agnieszka negative.    RECOMMENDATIONS: I recommend postoperative radiotherapy of 40.05 Wallace in 15 fractions and no tumor bed boost.  The pros and cons, risks and benefits " treatment were discussed with the patient and her  and informed consent obtained.  I suggested that after radiotherapy she tried tamoxifen and see she can tolerate it without side effects.  She wants to undergo radiotherapy in [place] and states the [facility].  An appointment was made for her to return for treatment planning at which time we will help arrange lodging.  Thank you very much for letting me participate in the care of Mrs. Nassar.          Izabella Tavarez MD      Errors in dictation may reflect use of voice recognition software and not all errors in transcription may have been detected prior to signing.

## 2019-02-05 ENCOUNTER — HOSPITAL ENCOUNTER (OUTPATIENT)
Dept: RADIATION ONCOLOGY | Facility: HOSPITAL | Age: 69
Discharge: HOME | End: 2019-02-05

## 2019-02-05 ENCOUNTER — HOSPITAL ENCOUNTER (OUTPATIENT)
Dept: RADIATION ONCOLOGY | Facility: HOSPITAL | Age: 69
Setting detail: RADIATION/ONCOLOGY SERIES
Discharge: HOME | End: 2019-02-05

## 2019-02-05 PROCEDURE — 77290 THER RAD SIMULAJ FIELD CPLX: CPT | Performed by: RADIOLOGY

## 2019-02-05 PROCEDURE — 77332 RADIATION TREATMENT AID(S): CPT | Performed by: RADIOLOGY

## 2019-02-06 ENCOUNTER — APPOINTMENT (OUTPATIENT)
Dept: RADIATION ONCOLOGY | Facility: HOSPITAL | Age: 69
End: 2019-02-06

## 2019-02-06 ENCOUNTER — TELEPHONE (OUTPATIENT)
Dept: SOCIAL WORK | Facility: HOSPITAL | Age: 69
End: 2019-02-06

## 2019-02-06 NOTE — TELEPHONE ENCOUNTER
SW called pt to provide information about lodging resources.  Pt is from [place] and needs to stay while undergoing radiation treatments.  SW informed pt of the option of [facility] and pt was agreeable.  SW completed and faxed in the referral for pt.  SW available for ongoing support and resource needs.

## 2019-02-11 PROCEDURE — 77295 3-D RADIOTHERAPY PLAN: CPT | Performed by: RADIOLOGY

## 2019-02-11 PROCEDURE — 77300 RADIATION THERAPY DOSE PLAN: CPT | Performed by: RADIOLOGY

## 2019-02-11 PROCEDURE — 77334 RADIATION TREATMENT AID(S): CPT | Performed by: RADIOLOGY

## 2019-02-18 ENCOUNTER — TELEPHONE (OUTPATIENT)
Dept: SOCIAL WORK | Facility: HOSPITAL | Age: 69
End: 2019-02-18

## 2019-02-18 NOTE — TELEPHONE ENCOUNTER
SW received a phone call from pt requesting assistance with lodging needs.  She has been accepted to stay at [facility] starting on 2/23, but needs arrangements for Tuesday-Thursday of this week.  SW provided two nights of free vouchers to pt for use at [facility] and pt will pay for one night in addition to those.  SW will plan to meet pt tomorrow with vouchers.  SW available for ongoing support and resource needs.

## 2019-02-19 ENCOUNTER — HOSPITAL ENCOUNTER (OUTPATIENT)
Dept: RADIATION ONCOLOGY | Facility: HOSPITAL | Age: 69
Discharge: HOME | End: 2019-02-19

## 2019-02-19 VITALS — WEIGHT: 157 LBS | BODY MASS INDEX: 31.18 KG/M2

## 2019-02-19 PROCEDURE — 77280 THER RAD SIMULAJ FIELD SMPL: CPT | Performed by: RADIOLOGY

## 2019-02-20 ENCOUNTER — HOSPITAL ENCOUNTER (OUTPATIENT)
Dept: RADIATION ONCOLOGY | Facility: HOSPITAL | Age: 69
Discharge: HOME | End: 2019-02-20

## 2019-02-20 PROCEDURE — 77387 GUIDANCE FOR RADJ TX DLVR: CPT | Performed by: RADIOLOGY

## 2019-02-20 PROCEDURE — 77412 RADIATION TX DELIVERY LVL 3: CPT | Performed by: RADIOLOGY

## 2019-02-21 ENCOUNTER — HOSPITAL ENCOUNTER (OUTPATIENT)
Dept: RADIATION ONCOLOGY | Facility: HOSPITAL | Age: 69
Discharge: HOME | End: 2019-02-21

## 2019-02-21 PROCEDURE — 77336 RADIATION PHYSICS CONSULT: CPT | Performed by: RADIOLOGY

## 2019-02-21 PROCEDURE — 77387 GUIDANCE FOR RADJ TX DLVR: CPT | Performed by: RADIOLOGY

## 2019-02-21 PROCEDURE — 77412 RADIATION TX DELIVERY LVL 3: CPT | Performed by: RADIOLOGY

## 2019-02-22 ENCOUNTER — HOSPITAL ENCOUNTER (OUTPATIENT)
Dept: RADIATION ONCOLOGY | Facility: HOSPITAL | Age: 69
Discharge: HOME | End: 2019-02-22

## 2019-02-22 PROCEDURE — 77412 RADIATION TX DELIVERY LVL 3: CPT | Performed by: RADIOLOGY

## 2019-02-22 PROCEDURE — 77387 GUIDANCE FOR RADJ TX DLVR: CPT | Performed by: RADIOLOGY

## 2019-02-25 ENCOUNTER — HOSPITAL ENCOUNTER (OUTPATIENT)
Dept: RADIATION ONCOLOGY | Facility: HOSPITAL | Age: 69
Discharge: HOME | End: 2019-02-25

## 2019-02-25 ENCOUNTER — APPOINTMENT (OUTPATIENT)
Dept: RADIATION ONCOLOGY | Facility: HOSPITAL | Age: 69
End: 2019-02-25

## 2019-02-25 PROCEDURE — 77387 GUIDANCE FOR RADJ TX DLVR: CPT | Performed by: RADIOLOGY

## 2019-02-25 PROCEDURE — 77412 RADIATION TX DELIVERY LVL 3: CPT | Performed by: RADIOLOGY

## 2019-02-26 ENCOUNTER — HOSPITAL ENCOUNTER (OUTPATIENT)
Dept: RADIATION ONCOLOGY | Facility: HOSPITAL | Age: 69
Discharge: HOME | End: 2019-02-26

## 2019-02-26 VITALS — WEIGHT: 158.1 LBS | BODY MASS INDEX: 31.4 KG/M2

## 2019-02-26 PROCEDURE — 77412 RADIATION TX DELIVERY LVL 3: CPT | Performed by: RADIOLOGY

## 2019-02-26 PROCEDURE — 77387 GUIDANCE FOR RADJ TX DLVR: CPT | Performed by: RADIOLOGY

## 2019-02-27 ENCOUNTER — HOSPITAL ENCOUNTER (OUTPATIENT)
Dept: RADIATION ONCOLOGY | Facility: HOSPITAL | Age: 69
Discharge: HOME | End: 2019-02-27

## 2019-02-27 PROCEDURE — 77412 RADIATION TX DELIVERY LVL 3: CPT | Performed by: RADIOLOGY

## 2019-02-27 PROCEDURE — 77387 GUIDANCE FOR RADJ TX DLVR: CPT | Performed by: RADIOLOGY

## 2019-02-28 ENCOUNTER — HOSPITAL ENCOUNTER (OUTPATIENT)
Dept: RADIATION ONCOLOGY | Facility: HOSPITAL | Age: 69
Discharge: HOME | End: 2019-02-28

## 2019-02-28 PROCEDURE — 77336 RADIATION PHYSICS CONSULT: CPT | Performed by: RADIOLOGY

## 2019-02-28 PROCEDURE — 77412 RADIATION TX DELIVERY LVL 3: CPT | Performed by: RADIOLOGY

## 2019-02-28 PROCEDURE — 77387 GUIDANCE FOR RADJ TX DLVR: CPT | Performed by: RADIOLOGY

## 2019-03-01 ENCOUNTER — HOSPITAL ENCOUNTER (OUTPATIENT)
Dept: RADIATION ONCOLOGY | Facility: HOSPITAL | Age: 69
Setting detail: RADIATION/ONCOLOGY SERIES
Discharge: HOME | End: 2019-03-01

## 2019-03-01 ENCOUNTER — HOSPITAL ENCOUNTER (OUTPATIENT)
Dept: RADIATION ONCOLOGY | Facility: HOSPITAL | Age: 69
Discharge: HOME | End: 2019-03-01

## 2019-03-01 PROCEDURE — 77387 GUIDANCE FOR RADJ TX DLVR: CPT | Performed by: RADIOLOGY

## 2019-03-01 PROCEDURE — 77412 RADIATION TX DELIVERY LVL 3: CPT | Performed by: RADIOLOGY

## 2019-03-04 ENCOUNTER — HOSPITAL ENCOUNTER (OUTPATIENT)
Dept: RADIATION ONCOLOGY | Facility: HOSPITAL | Age: 69
Discharge: HOME | End: 2019-03-04

## 2019-03-04 PROCEDURE — 77387 GUIDANCE FOR RADJ TX DLVR: CPT | Performed by: RADIOLOGY

## 2019-03-04 PROCEDURE — 77412 RADIATION TX DELIVERY LVL 3: CPT | Performed by: RADIOLOGY

## 2019-03-05 ENCOUNTER — HOSPITAL ENCOUNTER (OUTPATIENT)
Dept: RADIATION ONCOLOGY | Facility: HOSPITAL | Age: 69
Discharge: HOME | End: 2019-03-05

## 2019-03-05 VITALS — WEIGHT: 158.5 LBS | BODY MASS INDEX: 31.48 KG/M2

## 2019-03-05 PROCEDURE — 77412 RADIATION TX DELIVERY LVL 3: CPT | Performed by: RADIOLOGY

## 2019-03-05 PROCEDURE — 77387 GUIDANCE FOR RADJ TX DLVR: CPT | Performed by: RADIOLOGY

## 2019-03-06 ENCOUNTER — HOSPITAL ENCOUNTER (OUTPATIENT)
Dept: RADIATION ONCOLOGY | Facility: HOSPITAL | Age: 69
Discharge: HOME | End: 2019-03-06

## 2019-03-06 PROCEDURE — 77387 GUIDANCE FOR RADJ TX DLVR: CPT | Performed by: RADIOLOGY

## 2019-03-06 PROCEDURE — 77412 RADIATION TX DELIVERY LVL 3: CPT | Performed by: RADIOLOGY

## 2019-03-07 ENCOUNTER — HOSPITAL ENCOUNTER (OUTPATIENT)
Dept: RADIATION ONCOLOGY | Facility: HOSPITAL | Age: 69
Discharge: HOME | End: 2019-03-07

## 2019-03-07 PROCEDURE — 77412 RADIATION TX DELIVERY LVL 3: CPT | Performed by: RADIOLOGY

## 2019-03-07 PROCEDURE — 77387 GUIDANCE FOR RADJ TX DLVR: CPT | Performed by: RADIOLOGY

## 2019-03-08 ENCOUNTER — HOSPITAL ENCOUNTER (OUTPATIENT)
Dept: RADIATION ONCOLOGY | Facility: HOSPITAL | Age: 69
Discharge: HOME | End: 2019-03-08

## 2019-03-08 PROCEDURE — 77387 GUIDANCE FOR RADJ TX DLVR: CPT | Performed by: RADIOLOGY

## 2019-03-08 PROCEDURE — 77412 RADIATION TX DELIVERY LVL 3: CPT | Performed by: RADIOLOGY

## 2019-03-08 PROCEDURE — 77336 RADIATION PHYSICS CONSULT: CPT | Performed by: RADIOLOGY

## 2019-03-11 ENCOUNTER — HOSPITAL ENCOUNTER (OUTPATIENT)
Dept: RADIATION ONCOLOGY | Facility: HOSPITAL | Age: 69
Discharge: HOME | End: 2019-03-11

## 2019-03-11 PROCEDURE — 77387 GUIDANCE FOR RADJ TX DLVR: CPT | Performed by: RADIOLOGY

## 2019-03-11 PROCEDURE — 77412 RADIATION TX DELIVERY LVL 3: CPT | Performed by: RADIOLOGY

## 2019-03-12 ENCOUNTER — DOCUMENTATION (OUTPATIENT)
Dept: ONCOLOGY | Facility: CLINIC | Age: 69
End: 2019-03-12

## 2019-03-12 ENCOUNTER — HOSPITAL ENCOUNTER (OUTPATIENT)
Dept: RADIATION ONCOLOGY | Facility: HOSPITAL | Age: 69
Discharge: HOME | End: 2019-03-12

## 2019-03-12 VITALS — WEIGHT: 159.5 LBS | BODY MASS INDEX: 31.68 KG/M2

## 2019-03-12 PROCEDURE — 77387 GUIDANCE FOR RADJ TX DLVR: CPT | Performed by: RADIOLOGY

## 2019-03-12 PROCEDURE — 77412 RADIATION TX DELIVERY LVL 3: CPT | Performed by: RADIOLOGY

## 2019-03-12 NOTE — PROGRESS NOTES
Hyacinth Marshall called to say that patient did not meet me at her surgical visit - and that there were several times during her treatment and surgery that she had questions that were not addressed that she did not have her calls returned. She did say that I was able to help her when she reached out to me to help her get her genetics appointment - so she knew I was available going forward - all in all she states that her care was good it was just bad communication -

## 2019-03-13 NOTE — RADIATION COMPLETION NOTES
COMPLETION NOTE    PATIENT:   Kayleen Nassar  :    1950  COMPLETION DATE:   3/12/2019  DIAGNOSIS:   Malignant neoplasm of upper-inner quadrant of left breast in female, estrogen receptor positive (CMS/HCC)  Staging form: Breast, AJCC 8th Edition  - Clinical: No stage assigned - Unsigned  - Pathologic stage from 2019: Stage IA (pT1b, pN0, cM0, G2, ER: Positive, RI: Positive, HER2: Negative) - Signed by Izabella Tavarez MD on 2019              Subjective      BRIEF HISTORY:  Kayleen Nassar  is a very pleasant 68 y.o. female  who had an asymmetry in the the left breast at the 12 o'clock position.  On ultrasound the mass was confirmed at the 11 to 12 o'clock position.  Biopsy was negative but she went on to left breast needle localization and excision.  It was positive for infiltrating intermediate grade ductal adenocarcinoma, tumor size 6 x 3 x 3 mm, Gomez Ryan score 6/9.  Margins were negative by 2.5 mm.  There was no lymphovascular invasion.  The tumor was ER/RI positive HER-2/agnieszka negative. In a separate procedure she had left sentinel node sampling and 0/2 nodes were positive for tumor.  The patient was prescribed tamoxifen per Dr. Snyder.  Her sister  of breast cancer 4 years after diagnosis despite chemotherapy and tamoxifen.    Ms. Nassar received radiotherapy in our department as follows:    TREATMENT COURSE:   -3/12/2019 the left breast received 40.05 Gy in 15 fractions with 10 MV photons    TOLERANCE:   Ms. Nassar tolerated radiation treatment well.  Her skin became mildly erythematous.  Unrelated she developed a vaginal yeast infection and used over-the-counter medication.  She was concerned about taking tamoxifen and Wellbutrin and wanted a referral to hematology/oncology.  Dr Snyder was in agreement and the referral was made.  He recommended she stop tamoxifen until she was seen.  She met with our  and Irene of [facility] regarding coordination of the hemonc  appointment.       DISPOSITION:  At the completion of therapy an appointment was made for her to return on 4/18/2019 at 11:30 AM.  She knows to call if she has any problems sooner.        Izabella Tavarez MD    Dictated using dragon dictation

## 2019-03-19 ENCOUNTER — CONSULT (OUTPATIENT)
Dept: ONCOLOGY | Facility: CLINIC | Age: 69
End: 2019-03-19

## 2019-03-19 VITALS
TEMPERATURE: 97.4 F | SYSTOLIC BLOOD PRESSURE: 160 MMHG | WEIGHT: 159 LBS | RESPIRATION RATE: 18 BRPM | HEIGHT: 60 IN | HEART RATE: 76 BPM | DIASTOLIC BLOOD PRESSURE: 74 MMHG | BODY MASS INDEX: 31.22 KG/M2

## 2019-03-19 DIAGNOSIS — C50.912 MALIGNANT NEOPLASM OF LEFT BREAST IN FEMALE, ESTROGEN RECEPTOR POSITIVE, UNSPECIFIED SITE OF BREAST (HCC): Primary | ICD-10-CM

## 2019-03-19 DIAGNOSIS — Z17.0 MALIGNANT NEOPLASM OF LEFT BREAST IN FEMALE, ESTROGEN RECEPTOR POSITIVE, UNSPECIFIED SITE OF BREAST (HCC): Primary | ICD-10-CM

## 2019-03-19 PROCEDURE — 99204 OFFICE O/P NEW MOD 45 MIN: CPT | Performed by: INTERNAL MEDICINE

## 2019-03-19 RX ORDER — PANTOPRAZOLE SODIUM 40 MG/1
TABLET, DELAYED RELEASE ORAL
COMMUNITY
Start: 2019-03-09

## 2019-03-19 NOTE — PROGRESS NOTES
Subjective     PROBLEM LIST:  1. [id number] (Stage IA) ER+ IA+ Her2 negative invasive ductal carcinoma of the left breast  A) left lumpectomy on 11/8/18 showed a grade 2 IDC measuring 6 mm.  On 12/6/18 sentinal LN excision showed 0/2 nodes involved.  Adjuvant radiation completed 3/12/19.  2. Hypertension  3. Depression     CHIEF COMPLAINT: breast cancer      HISTORY OF PRESENT ILLNESS:  The patient is a 68 y.o. year old female, referred for a recent diagnosis of breast cancer.  She presented with an abnormality on screening mammogram.  She underwent lumpectomy and subsequent SLN biopsy.  She was started on tamoxifen and has recently completed radiation treatment.  She says she took tamoxifen for about 3 weeks but then stopped it because of a potential interaction with wellbutrin.  She has concerns about treatment with aromatase inhibitors because of a family history of osteoporosis and a personal history of osteopenia.      REVIEW OF SYSTEMS:  A 14 point review of systems was performed and is negative except as noted above.    Past Medical History:   Diagnosis Date   • Chronic kidney disease    • Hyperlipidemia    • Malignant neoplasm of upper-inner quadrant of left breast in female, estrogen receptor positive (CMS/[facility]) 1/28/2019         Current Outpatient Medications on File Prior to Visit   Medication Sig Dispense Refill   • bisoprolol-hydrochlorothiazide (ZIAC) 5-6.25 MG per tablet      • buPROPion XL (WELLBUTRIN XL) 150 MG 24 hr tablet Take 150 mg by mouth Daily.     • lisinopril (PRINIVIL,ZESTRIL) 10 MG tablet      • pantoprazole (PROTONIX) 40 MG EC tablet      • tamoxifen (NOLVADEX) 20 MG chemo tablet        No current facility-administered medications on file prior to visit.        No Known Allergies    Past Surgical History:   Procedure Laterality Date   • BACK SURGERY      times 2   • HYSTERECTOMY  2001   • OOPHORECTOMY Bilateral 2001       Social History     Socioeconomic History   • Marital status:  "     Spouse name: Not on file   • Number of children: Not on file   • Years of education: Not on file   • Highest education level: Not on file   Tobacco Use   • Smoking status: Never Smoker   • Smokeless tobacco: Never Used   Substance and Sexual Activity   • Alcohol use: No   • Drug use: No   • Sexual activity: Yes     Partners: Male     Birth control/protection: Surgical       Family History   Problem Relation Age of Onset   • Breast cancer Sister 50   • Ovarian cancer Sister 52       Objective     /74   Pulse 76   Temp 97.4 °F (36.3 °C) (Temporal)   Resp 18   Ht 151.1 cm (59.5\")   Wt 72.1 kg (159 lb)   BMI 31.58 kg/m²   Performance Status: 0  General: well appearing female in no acute distress  Neuro: alert and oriented  HEENT: sclera anicteric, oropharynx clear  Lymphatics: no cervical, supraclavicular, or axillary adenopathy  Cardiovascular: regular rate and rhythm, no murmurs  Lungs: clear to auscultation bilaterally  Abdomen: soft, nontender, nondistended.  No palpable organomegaly  Extremeties: no lower extremity edema  Skin: no rashes, lesions, bruising, or petechiae  Psych: mood and affect appropriate            Assessment/Plan     Kayleen Nassar is a 68 y.o. year old female with a stage IA ER+ Her2 negative IDC of the left breast who presents for initial evaluation.    We discussed that adjuvant endocrine therapy is appropriate for her cancer, though the absolute benefit is likely < 5% because she has low risk disease.  Tamoxifen is an option, but because wellbutrin can affect the metabolism of this drug, switching to a different antidepressant would be recommended.  We discussed that effexor, pristiq, and zoloft would all be acceptable options.  She has tried other antidepressants in the past, including effexor, and they were not good mediations for her.   wellbutrin has been something that has been beneficial.    We discussed other options including treatment with an aromatase " inhibitor along with a zometa or prolia to help reduce bone fracture risk.  We also discussed the option of no hormonal therapy.    Ultimately she would like to continue with tamoxifen and taper off of wellbutrin.  I wrote out a 2 week taper for her.  We will see her back in 3 months.  If she does need to try an alternative antidepressant I would plan to refer her to behavioral health for discussion of options.    F/u 3 months.           Aida Zavaleta MD    3/19/2019

## 2019-04-04 ENCOUNTER — APPOINTMENT (OUTPATIENT)
Dept: LAB | Facility: HOSPITAL | Age: 69
End: 2019-04-04

## 2019-04-04 ENCOUNTER — OFFICE VISIT (OUTPATIENT)
Dept: OBSTETRICS AND GYNECOLOGY | Facility: CLINIC | Age: 69
End: 2019-04-04

## 2019-04-04 VITALS
WEIGHT: 161 LBS | HEIGHT: 60 IN | SYSTOLIC BLOOD PRESSURE: 118 MMHG | BODY MASS INDEX: 31.61 KG/M2 | DIASTOLIC BLOOD PRESSURE: 70 MMHG

## 2019-04-04 DIAGNOSIS — M85.89 OSTEOPENIA OF MULTIPLE SITES: ICD-10-CM

## 2019-04-04 DIAGNOSIS — R10.10 PAIN OF UPPER ABDOMEN: ICD-10-CM

## 2019-04-04 DIAGNOSIS — N76.0 ACUTE VAGINITIS: ICD-10-CM

## 2019-04-04 DIAGNOSIS — Z90.710 HX OF ABDOMINAL HYSTERECTOMY: Primary | ICD-10-CM

## 2019-04-04 DIAGNOSIS — N39.3 SUI (STRESS URINARY INCONTINENCE, FEMALE): ICD-10-CM

## 2019-04-04 LAB
BACTERIA UR QL AUTO: NORMAL /HPF
BILIRUB UR QL STRIP: NEGATIVE
CLARITY UR: CLEAR
COLOR UR: YELLOW
GLUCOSE UR STRIP-MCNC: NEGATIVE MG/DL
HGB UR QL STRIP.AUTO: NEGATIVE
HYALINE CASTS UR QL AUTO: NORMAL /LPF
KETONES UR QL STRIP: ABNORMAL
LEUKOCYTE ESTERASE UR QL STRIP.AUTO: NEGATIVE
NITRITE UR QL STRIP: NEGATIVE
PH UR STRIP.AUTO: <=5 [PH] (ref 5–8)
PROT UR QL STRIP: NEGATIVE
RBC # UR: NORMAL /HPF
REF LAB TEST METHOD: NORMAL
SP GR UR STRIP: 1.03 (ref 1–1.03)
SQUAMOUS #/AREA URNS HPF: NORMAL /HPF
UROBILINOGEN UR QL STRIP: ABNORMAL
WBC UR QL AUTO: NORMAL /HPF

## 2019-04-04 PROCEDURE — 99213 OFFICE O/P EST LOW 20 MIN: CPT | Performed by: OBSTETRICS & GYNECOLOGY

## 2019-04-04 PROCEDURE — 81001 URINALYSIS AUTO W/SCOPE: CPT | Performed by: OBSTETRICS & GYNECOLOGY

## 2019-04-04 RX ORDER — FLUCONAZOLE 150 MG/1
150 TABLET ORAL DAILY
Qty: 1 TABLET | Refills: 1 | Status: SHIPPED | OUTPATIENT
Start: 2019-04-04 | End: 2019-04-18

## 2019-04-04 NOTE — PROGRESS NOTES
"Subjective   Chief Complaint   Patient presents with   • Follow-up     Kayleen Nassar is a 68 y.o. year old .  No LMP recorded. Patient has had a hysterectomy.  She presents to be seen because of need to discuss osteopenia - last DEXA  at [facility].  She did not get another one followed up as her  is having some health issues.  Angel herself is undergone breast biopsy and then lymph node biopsy for ductal carcinoma.  Having trouble tolerating tamoxifen.  She is also had some issues with stress urinary incontinence this is been getting worse.  Discussed that normally would like to treat this with some vaginal estrogen but definitely do not want to do that currently while she is on tamoxifen.  Years down the road we may be able to try something such as Vagifem or may be Intrarosa etc.  I would like to be more conservative with Kegel exercises and timed voiding.  Hopefully avoid surgery.                    The following portions of the patient's history were reviewed and updated as appropriate:problem list, current medications and allergies    Review of Systems normal bladder and bowels except issue with constipation and gas on Tamoxifen - restarted for 6 weeks ; she doesn't tolerate meds - Fosamax etc upset stomach   Weight up ~ 6 lbs recently     Objective   /70   Ht 152.4 cm (60\")   Wt 73 kg (161 lb)   Breastfeeding? No   BMI 31.44 kg/m²     General:  well developed; well nourished  no acute distress  appears stated age   Skin:  No suspicious lesions seen   Thyroid: normal to inspection and palpation   Lungs:  breathing is unlabored   Heart:  Not performed.   Abdomen: soft, non-tender; no masses  no umbilical or inguinal hernias are present  no hepato-splenomegaly   Pelvis: Clinical staff was present for exam  External genitalia:  normal appearance of the external genitalia including Bartholin's and Tusculum's glands.  :  urethral meatus normal; urethral hypermobility is absent.  Vaginal:  " atrophic mucosal changes are present; she is able to perform a Kegel contraction upon request;  Uterus:  absent.  Adnexa:  non palpable bilaterally.     Lab Review   PATHOLOGY      Imaging   Mammogram report       Assessment   1. Osteopenia we will give information regarding adequate calcium in her diet exercise with weightbearing walking etc. 5-6 days/week.  She does not tolerate bisphosphonates last DEXA showed a -1.2 T score.  This been 10 years ago so I think we need to follow that up.  2. Stress urinary incontinence we will try timed voiding Kegel's for 5 minutes a day and as needed  3. Symptoms of yeast infection and white thick discharge on exam.  Unfortunately did not do a wet mount today.  I will treat with Diflucan     Plan   1.  As above               Orders Placed This Encounter   Procedures   • DEXA Bone Density Axial     Standing Status:   Future     Standing Expiration Date:   4/4/2020     Order Specific Question:   Reason for Exam:     Answer:   Post-menopausal history of osteopenia 2009   • Urinalysis With Microscopic - Urine, Clean Catch     New Medications Ordered This Visit   Medications   • fluconazole (DIFLUCAN) 150 MG tablet     Sig: Take 1 tablet by mouth Daily.     Dispense:  1 tablet     Refill:  1          I spent a total of 20 minutes, greater than half the time was in counseling the patient.  This note was electronically signed.    Amado Andrade MD  April 4, 2019

## 2019-04-05 PROBLEM — N39.3 SUI (STRESS URINARY INCONTINENCE, FEMALE): Status: ACTIVE | Noted: 2019-04-05

## 2019-04-18 ENCOUNTER — OFFICE VISIT (OUTPATIENT)
Dept: RADIATION ONCOLOGY | Facility: HOSPITAL | Age: 69
End: 2019-04-18

## 2019-04-18 ENCOUNTER — HOSPITAL ENCOUNTER (OUTPATIENT)
Dept: BONE DENSITY | Facility: HOSPITAL | Age: 69
Discharge: HOME | End: 2019-04-18
Admitting: OBSTETRICS & GYNECOLOGY

## 2019-04-18 ENCOUNTER — HOSPITAL ENCOUNTER (OUTPATIENT)
Dept: RADIATION ONCOLOGY | Facility: HOSPITAL | Age: 69
Setting detail: RADIATION/ONCOLOGY SERIES
Discharge: HOME | End: 2019-04-18

## 2019-04-18 VITALS
DIASTOLIC BLOOD PRESSURE: 70 MMHG | HEART RATE: 81 BPM | BODY MASS INDEX: 32.2 KG/M2 | HEIGHT: 60 IN | SYSTOLIC BLOOD PRESSURE: 127 MMHG | RESPIRATION RATE: 18 BRPM | TEMPERATURE: 98 F | WEIGHT: 164 LBS

## 2019-04-18 DIAGNOSIS — Z85.3 HISTORY OF BREAST CANCER: Primary | ICD-10-CM

## 2019-04-18 DIAGNOSIS — M85.89 OSTEOPENIA OF MULTIPLE SITES: ICD-10-CM

## 2019-04-18 PROCEDURE — 77080 DXA BONE DENSITY AXIAL: CPT

## 2019-04-18 PROCEDURE — G0463 HOSPITAL OUTPT CLINIC VISIT: HCPCS

## 2019-04-18 NOTE — PROGRESS NOTES
FOLLOW UP NOTE    PATIENT:                                                      Kayleen Nassar  MEDICAL RECORD #:                        [id number]  :                                                        1950  COMPLETION DATE:   3/12/2019  DIAGNOSIS:     Cancer Staging  Malignant neoplasm of upper-inner quadrant of left breast in female, estrogen receptor positive (CMS/HCC)  Staging form: Breast, AJCC 8th Edition  - Clinical: No stage assigned - Unsigned  - Pathologic stage from 2019: Stage IA (pT1b, pN0, cM0, G2, ER: Positive, WA: Positive, HER2: Negative) - Signed by Izabella Tavarez MD on 2019        BRIEF HISTORY:    Kayleen Nassar  is a very pleasant 68 y.o. female  who had an asymmetry in the the left breast at the 12 o'clock position.  On ultrasound the mass was confirmed at the 11 to 12 o'clock position.  Biopsy was negative but she went on to left breast needle localization and excision.  It was positive for infiltrating intermediate grade ductal adenocarcinoma, tumor size 6 x 3 x 3 mm, Gomez Ryan score 6/9.  Margins were negative by 2.5 mm.  There was no lymphovascular invasion.  The tumor was ER/WA positive HER-2/agnieszka negative. In a separate procedure she had left sentinel node sampling and 0/2 nodes were positive for tumor.  The patient was prescribed tamoxifen per Dr. Snyder.  Her sister  of breast cancer 4 years after diagnosis despite chemotherapy and tamoxifen.    Ms. Nassar received radiotherapy to the left breast of 40.05 Gy in 15 fractions.  Ms. Nassar tolerated radiation treatment well.  Her skin became mildly erythematous.   She was concerned about taking tamoxifen and Wellbutrin and wanted a referral to Dr. Zavaleta.    Ms. Nassar has no complaints today.        MEDICATIONS: Medication reconciliation for the patient was reviewed and confirmed in the electronic medical record.    Review of Systems - Oncology    KPS 90%    Physical Exam   Constitutional: She appears  "well-developed and well-nourished.   Cardiovascular: Normal rate.   Pulmonary/Chest: Effort normal.   Nursing note and vitals reviewed.  Examination of the left breast reveals it is slightly hyperpigmented.  The skin is healthy.    VITAL SIGNS:   Vitals:    04/18/19 1455   BP: 127/70   Pulse: 81   Resp: 18   Temp: 98 °F (36.7 °C)   Weight: 74.4 kg (164 lb)   Height: 152.4 cm (60\")   PainSc: 0-No pain       The following portions of the patient's history were reviewed and updated as appropriate: allergies, current medications, past family history, past medical history, past social history, past surgical history and problem list.         Kayleen was seen today for breast cancer.    Diagnoses and all orders for this visit:    History of breast cancer         IMPRESSION:  Kayleen Nassar had left breast needle localization and excision that was positive for infiltrating intermediate grade ductal adenocarcinoma, tumor size 6 x 3 x 3 mm, Gomez Ryan score 6/9.  Margins were negative by 2.5 mm.  There was no lymphovascular invasion.  The tumor was ER/TX positive HER-2/agnieszka negative. In a separate procedure she had left sentinel node sampling and 0/2 nodes were positive for tumor.  Ms. Nassar received radiotherapy to the left breast of 40.05 Gy in 15 fractions.  She has no complaints today.  The skin is hyperpigmented but healthy.  RECOMMENDATIONS: Ms. Prince met with Dr. Swenson and plans to continue tamoxifen.  Dr. Zavaleta plans to see her in 3 months.   I recommend mammogram at 6 months and follow up in our department.  It is a pleasure to participate in her care.  Return in about 6 months (around 10/18/2019) for PRN.    Izabella Tavarez MD    Errors in dictation may reflect use of voice recognition software and not all errors in transcription may have been detected prior to signing.  "

## 2019-10-02 ENCOUNTER — TELEPHONE (OUTPATIENT)
Dept: RADIATION ONCOLOGY | Facility: HOSPITAL | Age: 69
End: 2019-10-02

## 2019-10-02 NOTE — TELEPHONE ENCOUNTER
Patient called asking about Mammogram.  I reviewed her chart and found out she was supposed to follow up with Dr. Zavaleta in June and patient cancelled the appointment.  I told her she needs to reschedule that appointment and then they will set up a mammogram for her.  She verbalized understanding.

## 2019-10-03 ENCOUNTER — TELEPHONE (OUTPATIENT)
Dept: ONCOLOGY | Facility: CLINIC | Age: 69
End: 2019-10-03

## 2019-10-03 NOTE — TELEPHONE ENCOUNTER
Returned call to patient. Let her know she was due for follow up apt with Dr Zavaleta in June. If she would like to call and reschedule an apt, we will get order completed at that time.

## 2019-10-22 ENCOUNTER — APPOINTMENT (OUTPATIENT)
Dept: LAB | Facility: HOSPITAL | Age: 69
End: 2019-10-22

## 2019-10-22 ENCOUNTER — OFFICE VISIT (OUTPATIENT)
Dept: ONCOLOGY | Facility: CLINIC | Age: 69
End: 2019-10-22

## 2019-10-22 VITALS
SYSTOLIC BLOOD PRESSURE: 140 MMHG | BODY MASS INDEX: 32.2 KG/M2 | WEIGHT: 164 LBS | RESPIRATION RATE: 15 BRPM | HEART RATE: 68 BPM | TEMPERATURE: 97.5 F | HEIGHT: 60 IN | DIASTOLIC BLOOD PRESSURE: 66 MMHG

## 2019-10-22 DIAGNOSIS — Z17.0 MALIGNANT NEOPLASM OF UPPER-INNER QUADRANT OF LEFT BREAST IN FEMALE, ESTROGEN RECEPTOR POSITIVE (HCC): Primary | ICD-10-CM

## 2019-10-22 DIAGNOSIS — R53.83 FATIGUE, UNSPECIFIED TYPE: ICD-10-CM

## 2019-10-22 DIAGNOSIS — C50.212 MALIGNANT NEOPLASM OF UPPER-INNER QUADRANT OF LEFT BREAST IN FEMALE, ESTROGEN RECEPTOR POSITIVE (HCC): Primary | ICD-10-CM

## 2019-10-22 LAB
ALBUMIN SERPL-MCNC: 4 G/DL (ref 3.5–5.2)
ALBUMIN/GLOB SERPL: 1.2 G/DL
ALP SERPL-CCNC: 70 U/L (ref 39–117)
ALT SERPL W P-5'-P-CCNC: 13 U/L (ref 1–33)
ANION GAP SERPL CALCULATED.3IONS-SCNC: 13.8 MMOL/L (ref 5–15)
AST SERPL-CCNC: 21 U/L (ref 1–32)
BASOPHILS # BLD AUTO: 0.07 10*3/MM3 (ref 0–0.2)
BASOPHILS NFR BLD AUTO: 1.1 % (ref 0–1.5)
BILIRUB SERPL-MCNC: 0.2 MG/DL (ref 0.2–1.2)
BUN BLD-MCNC: 15 MG/DL (ref 8–23)
BUN/CREAT SERPL: 15.3 (ref 7–25)
CALCIUM SPEC-SCNC: 9.2 MG/DL (ref 8.6–10.5)
CHLORIDE SERPL-SCNC: 105 MMOL/L (ref 98–107)
CO2 SERPL-SCNC: 22.2 MMOL/L (ref 22–29)
CREAT BLD-MCNC: 0.98 MG/DL (ref 0.57–1)
DEPRECATED RDW RBC AUTO: 42 FL (ref 37–54)
EOSINOPHIL # BLD AUTO: 0.03 10*3/MM3 (ref 0–0.4)
EOSINOPHIL NFR BLD AUTO: 0.5 % (ref 0.3–6.2)
ERYTHROCYTE [DISTWIDTH] IN BLOOD BY AUTOMATED COUNT: 12.2 % (ref 12.3–15.4)
FERRITIN SERPL-MCNC: 300.4 NG/ML (ref 13–150)
FOLATE SERPL-MCNC: 16.1 NG/ML (ref 4.78–24.2)
GFR SERPL CREATININE-BSD FRML MDRD: 57 ML/MIN/1.73
GLOBULIN UR ELPH-MCNC: 3.4 GM/DL
GLUCOSE BLD-MCNC: 101 MG/DL (ref 65–99)
HCT VFR BLD AUTO: 40 % (ref 34–46.6)
HGB BLD-MCNC: 12.9 G/DL (ref 12–15.9)
IMM GRANULOCYTES # BLD AUTO: 0.02 10*3/MM3 (ref 0–0.05)
IMM GRANULOCYTES NFR BLD AUTO: 0.3 % (ref 0–0.5)
IRON 24H UR-MRATE: 83 MCG/DL (ref 37–145)
IRON SATN MFR SERPL: 23 % (ref 20–50)
LYMPHOCYTES # BLD AUTO: 2.08 10*3/MM3 (ref 0.7–3.1)
LYMPHOCYTES NFR BLD AUTO: 31.9 % (ref 19.6–45.3)
MCH RBC QN AUTO: 30.1 PG (ref 26.6–33)
MCHC RBC AUTO-ENTMCNC: 32.3 G/DL (ref 31.5–35.7)
MCV RBC AUTO: 93.2 FL (ref 79–97)
MONOCYTES # BLD AUTO: 0.44 10*3/MM3 (ref 0.1–0.9)
MONOCYTES NFR BLD AUTO: 6.7 % (ref 5–12)
NEUTROPHILS # BLD AUTO: 3.89 10*3/MM3 (ref 1.7–7)
NEUTROPHILS NFR BLD AUTO: 59.5 % (ref 42.7–76)
NRBC BLD AUTO-RTO: 0 /100 WBC (ref 0–0.2)
PLATELET # BLD AUTO: 267 10*3/MM3 (ref 140–450)
PMV BLD AUTO: 9.8 FL (ref 6–12)
POTASSIUM BLD-SCNC: 4.2 MMOL/L (ref 3.5–5.2)
PROT SERPL-MCNC: 7.4 G/DL (ref 6–8.5)
RBC # BLD AUTO: 4.29 10*6/MM3 (ref 3.77–5.28)
SODIUM BLD-SCNC: 141 MMOL/L (ref 136–145)
TIBC SERPL-MCNC: 364 MCG/DL (ref 298–536)
TRANSFERRIN SERPL-MCNC: 244 MG/DL (ref 200–360)
VIT B12 BLD-MCNC: 316 PG/ML (ref 211–946)
WBC NRBC COR # BLD: 6.53 10*3/MM3 (ref 3.4–10.8)

## 2019-10-22 PROCEDURE — 82746 ASSAY OF FOLIC ACID SERUM: CPT | Performed by: NURSE PRACTITIONER

## 2019-10-22 PROCEDURE — 99214 OFFICE O/P EST MOD 30 MIN: CPT | Performed by: NURSE PRACTITIONER

## 2019-10-22 PROCEDURE — 36415 COLL VENOUS BLD VENIPUNCTURE: CPT | Performed by: NURSE PRACTITIONER

## 2019-10-22 PROCEDURE — 84466 ASSAY OF TRANSFERRIN: CPT | Performed by: NURSE PRACTITIONER

## 2019-10-22 PROCEDURE — 82728 ASSAY OF FERRITIN: CPT | Performed by: NURSE PRACTITIONER

## 2019-10-22 PROCEDURE — 85025 COMPLETE CBC W/AUTO DIFF WBC: CPT | Performed by: NURSE PRACTITIONER

## 2019-10-22 PROCEDURE — 80053 COMPREHEN METABOLIC PANEL: CPT | Performed by: NURSE PRACTITIONER

## 2019-10-22 PROCEDURE — 83540 ASSAY OF IRON: CPT | Performed by: NURSE PRACTITIONER

## 2019-10-22 PROCEDURE — 82607 VITAMIN B-12: CPT | Performed by: NURSE PRACTITIONER

## 2019-10-22 NOTE — PROGRESS NOTES
"      PROBLEM LIST:  1. [id number] (Stage IA) ER+ MN+ Her2 negative invasive ductal carcinoma of the left breast  A) left lumpectomy on 11/8/18 showed a grade 2 IDC measuring 6 mm.  On 12/6/18 sentinal LN excision showed 0/2 nodes involved.  Adjuvant radiation completed 3/12/19.  2. Hypertension  3. Depression      Subjective     CHIEF COMPLAINT: Breast Cancer    HISTORY OF PRESENT ILLNESS:   Kayleen Laughlinpard returns for follow-up.  She is s/p lumpectomy and subsequent SLN biopsy. She was started on tamoxifen in March and has recently completed radiation treatment.  She has been tolerating treatment fairly well. She reports some muscle cramps in her legs. She has been off wellbutrin since last visit. She notes some fatigue and sadness. However, in the past 6 months she has lost a brother and brother-in-law. Her other brother is on hospice now. She missed her appointment because she reports that she had a lot going on. She also notes she has more fatigue lately and is unable to lose weight.           Past Medical History, Past Surgical History, Social History, Family History have been reviewed and are without significant changes except as mentioned.    Review of Systems   A comprehensive 14 point review of systems was performed and was negative except as mentioned.    Medications:  The current medication list was reviewed in the EMR    ALLERGIES:  No Known Allergies    Objective      /66   Pulse 68   Temp 97.5 °F (36.4 °C) (Temporal)   Resp 15   Ht 152.4 cm (60\")   Wt 74.4 kg (164 lb)   BMI 32.03 kg/m²      Performance Status: 0    General: well appearing female in no acute distress  Neuro: alert and oriented  HEENT: sclera anicteric, oropharynx clear  Lymphatics: no cervical, supraclavicular, or axillary adenopathy  Cardiovascular: regular rate and rhythm, no murmurs  Lungs: clear to auscultation bilaterally  Abdomen: soft, nontender, nondistended.  No palpable organomegaly  Extremeties: no lower extremity " edema  Skin: no rashes, lesions, bruising, or petechiae  Psych: mood and affect appropriate      RECENT LABS:        Lab Results   Component Value Date    HGB 13.9 11/07/2018    HCT 42.5 11/07/2018    MCV 92.2 11/07/2018     11/07/2018    WBC 9.21 11/07/2018     Lab Results   Component Value Date    GLUCOSE 102 (H) 11/07/2018    BUN 24 (H) 11/07/2018    CREATININE 1.19 11/07/2018     11/07/2018    K 4.4 11/07/2018     11/07/2018    CO2 26.0 11/07/2018    CALCIUM 9.4 11/07/2018           Assessment/Plan   Kayleen Nassar is a 69 y.o. year old female with a stage IA ER+ Her2 negative DIC of the left breast. We will continue tamoxifen. Overall, she is has tolerated treatment fairly well. We discussed potential side effects of tamoxifen treatment including an increased risk of blood clots, and increased risk of uterine cancer, hot flashes, and sleep or mood disturbance.     Muscle cramps: We discussed that it is unlikely that cramps are related to tamoxifen. I advised her to try to increase water intake as well as increasing activity. She can also try support hose to help with cramps. We will check labs today.        Depression: She has weaned off Wellbutrin. She feels that she does need something else for depression. I offered to start Effexor, but she has tried that in the past and it did not help. I will refer her to Nubia Escobar for further recommendations and counseling.     Fatigue: We discussed that her fatigue could be related to depression. We will check labs today to rule out anemia and vitamin deficiency. She will plan to follow up with PCP if labs are normal for further testing and recommendations.      Weight gain: She has gained minimal weight, however she has been unable to lose weight recently. I advised her to try lifestyle changes including increasing activity as well as joining a group for support and to help with calories reduction. She is going to think about this.      F/u 3  months.              I spent a total of  25 minutes in direct patient care, greater than  15   minutes (greater than 50%) were spent in coordination of care, and counseling the patient regarding breast cancer, depression, and weight managment .  I answered any questions patient had with medication and plan.       Isi Gonzalez, CHARO  [facility]    10/22/2019          CC:

## 2019-10-24 ENCOUNTER — DOCUMENTATION (OUTPATIENT)
Dept: ONCOLOGY | Facility: CLINIC | Age: 69
End: 2019-10-24

## 2019-12-26 ENCOUNTER — OFFICE VISIT (OUTPATIENT)
Dept: PSYCHIATRY | Facility: CLINIC | Age: 69
End: 2019-12-26

## 2019-12-26 DIAGNOSIS — F33.1 MODERATE EPISODE OF RECURRENT MAJOR DEPRESSIVE DISORDER (HCC): ICD-10-CM

## 2019-12-26 DIAGNOSIS — F43.21 COMPLICATED GRIEF: ICD-10-CM

## 2019-12-26 DIAGNOSIS — F51.05 INSOMNIA DUE TO MENTAL CONDITION: ICD-10-CM

## 2019-12-26 DIAGNOSIS — F41.1 GENERALIZED ANXIETY DISORDER: Primary | ICD-10-CM

## 2019-12-26 PROCEDURE — 90792 PSYCH DIAG EVAL W/MED SRVCS: CPT | Performed by: NURSE PRACTITIONER

## 2019-12-26 RX ORDER — TRAZODONE HYDROCHLORIDE 50 MG/1
50 TABLET ORAL NIGHTLY
Qty: 30 TABLET | Refills: 1 | Status: SHIPPED | OUTPATIENT
Start: 2019-12-26 | End: 2020-07-29

## 2019-12-26 RX ORDER — ALPRAZOLAM 0.25 MG/1
TABLET ORAL
Qty: 30 TABLET | Refills: 0 | Status: SHIPPED | OUTPATIENT
Start: 2019-12-26 | End: 2021-10-18

## 2019-12-26 RX ORDER — DULOXETIN HYDROCHLORIDE 30 MG/1
30 CAPSULE, DELAYED RELEASE ORAL DAILY
Qty: 30 CAPSULE | Refills: 1 | Status: SHIPPED | OUTPATIENT
Start: 2019-12-26 | End: 2020-01-28 | Stop reason: SDUPTHER

## 2019-12-26 NOTE — PROGRESS NOTES
"    Subjective   Kayleen Nassar is a 69 y.o. female who is here today for initial appointment. Patient was referred by: Isi MANCILLA for Aida Zavaleta MD, medical oncologist. Patient referred for depression and being weaned off Wellbutrin because of interaction with decreasing effectiveness with Tamoxifen for breast cancer. Patient lives in [place], [place] with her  of 50 years. She has transportation and prefers to come to [place] for mental healthcare.     Chief Complaint: fatigue, sleep disturbance, depression    History of Present Illness The patient reports depressive symptoms including depressed mood, crying spells, insomnia, anhedonia, feelings of guilt, feelings of helplessness, low energy and difficulty concentrating, decreased memory and increased edginess and irritability, present on most days for the past 6 month(s)  and have caused impairment in important areas of functioning. Depression rated 8/10 with 10 being the worst. Adamantly denies suicidal thoughts. Denies AVH, HI. She reports feeling overwhelmed by the losses she has experienced in past year with two brothers dying from cancer and two brother in laws. She reports her one brother dying was like loosing her parents again because he lived in their family [place] where she grew up and was born. She states her sister in law is wonderful but its not like when her brother was there. She has struggled more since being weaned off Wellbutrin because it would decrease Tamoxifen effectiveness yet she wasn't placed on Arimidex because of strong osteoporosis in mother \"she was bent over in end days\". Patient reports being the matriarch of family since her mother  in  and all family comes to her for concerns. She had [place] dinner yesterday and when praying she broke down crying \"that is NOT me\". She states she is the strong one and sees she does need help. She reports she thought she could get threw this but now sees it is still " "continuing to be a problem. She is more agitated with  \"he doesn't deserve that\". She is not sleeping well, she will be falling asleep on couch goes to bed and is wide awake having thoughts racing through her head and will awaken in middle of night and can't fall back to sleep with thoughts. The patient reports the following symptoms of generalized anxiety: constant anxiety/worry, restlessness/on edge, difficulty concentrating, mind goes blank, irritability, muscle tension and sleep disturbance. The symptoms have been present for at least 12 month(s) and increased over past 2 months and have caused impairment in important areas of functioning. She denies history of abuse, denies illicit drugs ever or alcohol. She denies PTSD< OCD, or manic like symptoms. She denies anger issues or self harming ever.     The following portions of the patient's history were reviewed and updated as appropriate: allergies, current medications, past family history, past medical history, past social history, past surgical history and problem list.      Past Psych History: denies inpt admissions. Denies outpt therapy but has had medication management in past for depression and anxiety. Xanax short term. Lexapro intolerant with stomach upset and has been trialed on Effexor with stomach upset. She reports Wellbutrin worked well for her in past for depressive symptoms. denies suicidal attempts. Brother committed suicide, having major depression.     Substance Abuse:   Denies all     ABUSE HX: denies.   LEGAL HX: denies     THIEN REVIEWED: Request # : [id number]   no red flags      Family Psychiatric History: bipolar sister, MDD brother who committed suicide, depression and anxiety in family   family history includes Breast cancer (age of onset: 50) in her sister; Ovarian cancer (age of onset: 52) in her sister.      Social History: born in [facility] in her family home.  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 Her one son became dependent on drugs and alcohol and after one year of rehab, her  quit the air force and retired and they moved back \"home\" to [place]. She worked in [employer] and retired in 2000. Her  is retired. Her children and grandchildren and one great grand are close to her. She has difficulty enjoying things now, does attend [employer] and reports strong candace or how do you get through all this.     Medical/Surgical History:  Past Medical History:   Diagnosis Date   • Chronic kidney disease    • Hyperlipidemia    • Malignant neoplasm of upper-inner quadrant of left breast in female, estrogen receptor positive (CMS/HCC) 1/28/2019     Past Surgical History:   Procedure Laterality Date   • BACK SURGERY      times 2   • HYSTERECTOMY  2001   • OOPHORECTOMY Bilateral 2001       No Known Allergies    Current Medications:   Current Outpatient Medications   Medication Sig Dispense Refill   • ALPRAZolam (XANAX) 0.25 MG tablet Take one tablet as needed for severe anxiety once a day 30 tablet 0   • bisoprolol-hydrochlorothiazide (ZIAC) 5-6.25 MG per tablet      • DULoxetine (CYMBALTA) 30 MG capsule Take 1 capsule by mouth Daily. 30 capsule 1   • lisinopril (PRINIVIL,ZESTRIL) 10 MG tablet      • pantoprazole (PROTONIX) 40 MG EC tablet      • tamoxifen (NOLVADEX) 20 MG chemo tablet      • traZODone (DESYREL) 50 MG tablet Take 1 tablet by mouth Every Night. 30 tablet 1     No current facility-administered medications for this visit.        Lab Results:  Office Visit on 10/22/2019   Component Date Value Ref Range Status   • Glucose 10/22/2019 101* 65 - 99 mg/dL Final   • BUN 10/22/2019 15  8 - 23 mg/dL Final   • Creatinine 10/22/2019 0.98  0.57 - 1.00 mg/dL Final   • Sodium 10/22/2019 141  136 - 145 mmol/L Final   • Potassium 10/22/2019 4.2  3.5 - 5.2 mmol/L Final   • Chloride 10/22/2019 105  98 - 107 mmol/L Final   • CO2 10/22/2019 22.2  22.0 - " 29.0 mmol/L Final   • Calcium 10/22/2019 9.2  8.6 - 10.5 mg/dL Final   • Total Protein 10/22/2019 7.4  6.0 - 8.5 g/dL Final   • Albumin 10/22/2019 4.00  3.50 - 5.20 g/dL Final   • ALT (SGPT) 10/22/2019 13  1 - 33 U/L Final   • AST (SGOT) 10/22/2019 21  1 - 32 U/L Final    Specimen hemolyzed.  Results may be affected.   • Alkaline Phosphatase 10/22/2019 70  39 - 117 U/L Final   • Total Bilirubin 10/22/2019 0.2  0.2 - 1.2 mg/dL Final   • eGFR Non African Amer 10/22/2019 57* >60 mL/min/1.73 Final   • Globulin 10/22/2019 3.4  gm/dL Final   • A/G Ratio 10/22/2019 1.2  g/dL Final   • BUN/Creatinine Ratio 10/22/2019 15.3  7.0 - 25.0 Final   • Anion Gap 10/22/2019 13.8  5.0 - 15.0 mmol/L Final   • Vitamin B-12 10/22/2019 316  211 - 946 pg/mL Final   • Folate 10/22/2019 16.10  4.78 - 24.20 ng/mL Final   • Ferritin 10/22/2019 300.40* 13.00 - 150.00 ng/mL Final   • Iron 10/22/2019 83  37 - 145 mcg/dL Final   • Iron Saturation 10/22/2019 23  20 - 50 % Final   • Transferrin 10/22/2019 244  200 - 360 mg/dL Final   • TIBC 10/22/2019 364  298 - 536 mcg/dL Final   • WBC 10/22/2019 6.53  3.40 - 10.80 10*3/mm3 Final   • RBC 10/22/2019 4.29  3.77 - 5.28 10*6/mm3 Final   • Hemoglobin 10/22/2019 12.9  12.0 - 15.9 g/dL Final   • Hematocrit 10/22/2019 40.0  34.0 - 46.6 % Final   • MCV 10/22/2019 93.2  79.0 - 97.0 fL Final   • MCH 10/22/2019 30.1  26.6 - 33.0 pg Final   • MCHC 10/22/2019 32.3  31.5 - 35.7 g/dL Final   • RDW 10/22/2019 12.2* 12.3 - 15.4 % Final   • RDW-SD 10/22/2019 42.0  37.0 - 54.0 fl Final   • MPV 10/22/2019 9.8  6.0 - 12.0 fL Final   • Platelets 10/22/2019 267  140 - 450 10*3/mm3 Final   • Neutrophil % 10/22/2019 59.5  42.7 - 76.0 % Final   • Lymphocyte % 10/22/2019 31.9  19.6 - 45.3 % Final   • Monocyte % 10/22/2019 6.7  5.0 - 12.0 % Final   • Eosinophil % 10/22/2019 0.5  0.3 - 6.2 % Final   • Basophil % 10/22/2019 1.1  0.0 - 1.5 % Final   • Immature Grans % 10/22/2019 0.3  0.0 - 0.5 % Final   • Neutrophils, Absolute  "10/22/2019 3.89  1.70 - 7.00 10*3/mm3 Final   • Lymphocytes, Absolute 10/22/2019 2.08  0.70 - 3.10 10*3/mm3 Final   • Monocytes, Absolute 10/22/2019 0.44  0.10 - 0.90 10*3/mm3 Final   • Eosinophils, Absolute 10/22/2019 0.03  0.00 - 0.40 10*3/mm3 Final   • Basophils, Absolute 10/22/2019 0.07  0.00 - 0.20 10*3/mm3 Final   • Immature Grans, Absolute 10/22/2019 0.02  0.00 - 0.05 10*3/mm3 Final   • nRBC 10/22/2019 0.0  0.0 - 0.2 /100 WBC Final         Review of Systems Constitutional: Negative for appetite change, chills, diaphoresis, fatigue, fever and unexpected weight change.   HENT: Negative for hearing loss, sore throat, trouble swallowing and voice change.    Eyes: Negative for photophobia and visual disturbance.   Respiratory: Negative for cough, chest tightness and shortness of breath.    Cardiovascular: Negative for chest pain and palpitations.   Gastrointestinal: Negative for abdominal pain, constipation, nausea and vomiting.   Endocrine: Negative for cold intolerance and heat intolerance.   Genitourinary: Negative for dysuria and frequency.   Musculoskeletal: POSITIVE for arthralgias, denies  back pain, joint swelling and neck stiffness.   Skin: Negative for color change and wound.   Allergic/Immunologic: Negative for environmental allergies and immunocompromised state.   Neurological: Negative for dizziness, tremors, seizures, syncope, weakness, light-headedness and headaches.   Hematological: Negative for adenopathy. Does not bruise/bleed easily.    Objective   Physical Exam  not currently breastfeeding.    Mental Status Exam:   Appearance: appropriate  Hygiene:   good  Cooperation:  Cooperative  Eye Contact:  Good  Psychomotor Behavior:  Appropriate  Mood:  anxious and depressed  Affect:  tearful throughout session   Hopelessness: not hopeless but feels helpless unable to \"pull herself out of depression and is anxious\"  Speech:  Normal  Thought Process:  Linear  Thought Content:  Normal  Suicidal:  " None  Homicidal:  None  Hallucinations:  None  Delusion:  None  Memory: grossly  Intact  Orientation:  Person, Place, Time and Situation  Reliability:  good  Insight:  Fair  Judgement:  Good  Impulse Control:  Good        Short-term goals: Patient will be compliant with clinic appointments.  Patient will be engaged in therapy, medication compliant with minimal side effects. Patient  will report decrease of symptoms and frequency.    Long-term goals: Patient will have minimal symptoms of mental health disorder with continued treatment. Patient will be compliant with treatment and appointments.       Problem list: depression, grieving, anxiety, insomnia   Strengths: patient appears motivated for treatment          Assessment/Plan   Diagnoses and all orders for this visit:    Generalized anxiety disorder  -     ALPRAZolam (XANAX) 0.25 MG tablet; Take one tablet as needed for severe anxiety once a day    Moderate episode of recurrent major depressive disorder (CMS/HCC)    Complicated grief    Insomnia due to mental condition    Other orders  -     DULoxetine (CYMBALTA) 30 MG capsule; Take 1 capsule by mouth Daily.  -     traZODone (DESYREL) 50 MG tablet; Take 1 tablet by mouth Every Night.          A psychological evaluation was conducted in order to assess past and current level of functioning. Areas assessed included, but were not limited to: perception of social support, perception of ability to face and deal with challenges in life (positive functioning), anxiety symptoms, depressive symptoms, perspective on beliefs/belief system, coping skills for stress, intelligence level,  Therapeutic rapport was established. Interventions conducted today were geared towards incorporating medication management along with support for continued therapy. Education was also provided as to the med management with this provider and what to expect in subsequent sessions.    Assisted patient in processing above session content;  acknowledged and normalized patient’s thoughts, feelings, and concerns.  Applied  positive coping skills and behavior management in session.  Allowed patient to freely discuss issues without interruption or judgment. Provided safe, confidential environment to facilitate the development of positive therapeutic relationship and encourage open, honest communication. Assisted patient in identifying risk factors which would indicate the need for higher level of care including thoughts to harm self or others and/or self-harming behavior and encouraged patient to contact this office, call 911, or present to the nearest emergency room should any of these events occur. Discussed crisis intervention services and means to access.  Patient adamantly and convincingly denies current suicidal or homicidal ideation or perceptual disturbance.      IMPRESSION/PLAN  RADHA, MDD recurrent mod, insomnia   Medication management     Discussed antidepressants and SNRI's, venlafaxine, desvenlafaxine, and duloxetine  Since pt has some bone discomfort  Will begin duloxetine and discussed some side effects that can occur in first week but should disapate after that, nausea and possible headache. Pt agreeable to trying this. Discussed goals as improving mood and decreasing anxiety   Begin Trazodone 50mg 1/2-1 tablet for sleep at bedtime   Begin alprazolam 0.25mg po one as needed for anxiety, up to once a day     We discussed risks, benefits,goals and side effects of the above medication and the patient was agreeable with the plan.Patient was educated on the importance of compliance with treatment and follow-up appointments.To call for questions or concerns and return early if necessary. Crisis plan reviewed including going to the Emergency department.  As part of this patient's treatment plan I may be prescribing controlled substances. The patient has been made aware of appropriate use of such medications, including potential risk for dependence  or overdose. It has also been made clear that these medications are for use by this patient only, without concomitant use of alcohol or other substances unless prescribed.    Patient has completed prescribing agreement detailing terms of continued prescribing of controlled substances, including monitoring THIEN reports, urine drug screening, and pill counts if necessary. The patient is aware that inappropriate use will result in cessation of prescribing such medications.         THIEN report has been reviewed and scanned into the patient's chart   Patient was counseled on medication safety and patient responsibility of medication against theft or stolen medications.  No early refills requests will be honored for lost or stolen medication.  Patient is expected to comply with requests for random medication counts and urine drug monitoring while receiving control substance therapy.    Also plan monthly CB T  for complicated grief/depression/anxiety     Treatment Plan: stabilize mood,  patient will stay out of the hospital and be at optimal level of functioning, take all medication as prescribed. Patient verbalized  understanding and agreement to plan.      Return in about 3 weeks (around 1/16/2020).

## 2020-01-28 ENCOUNTER — TRANSCRIBE ORDERS (OUTPATIENT)
Dept: MAMMOGRAPHY | Facility: HOSPITAL | Age: 70
End: 2020-01-28

## 2020-01-28 ENCOUNTER — HOSPITAL ENCOUNTER (OUTPATIENT)
Dept: MAMMOGRAPHY | Facility: HOSPITAL | Age: 70
Discharge: HOME | End: 2020-01-28
Admitting: NURSE PRACTITIONER

## 2020-01-28 ENCOUNTER — OFFICE VISIT (OUTPATIENT)
Dept: PSYCHIATRY | Facility: CLINIC | Age: 70
End: 2020-01-28

## 2020-01-28 DIAGNOSIS — F51.05 INSOMNIA DUE TO MENTAL CONDITION: ICD-10-CM

## 2020-01-28 DIAGNOSIS — F41.1 GENERALIZED ANXIETY DISORDER: ICD-10-CM

## 2020-01-28 DIAGNOSIS — R92.8 ABNORMAL MAMMOGRAM: Primary | ICD-10-CM

## 2020-01-28 DIAGNOSIS — F33.1 MODERATE EPISODE OF RECURRENT MAJOR DEPRESSIVE DISORDER (HCC): ICD-10-CM

## 2020-01-28 DIAGNOSIS — C50.212 MALIGNANT NEOPLASM OF UPPER-INNER QUADRANT OF LEFT BREAST IN FEMALE, ESTROGEN RECEPTOR POSITIVE (HCC): ICD-10-CM

## 2020-01-28 DIAGNOSIS — F43.21 COMPLICATED GRIEF: Primary | ICD-10-CM

## 2020-01-28 DIAGNOSIS — Z17.0 MALIGNANT NEOPLASM OF UPPER-INNER QUADRANT OF LEFT BREAST IN FEMALE, ESTROGEN RECEPTOR POSITIVE (HCC): ICD-10-CM

## 2020-01-28 PROCEDURE — 77066 DX MAMMO INCL CAD BI: CPT

## 2020-01-28 PROCEDURE — G0279 TOMOSYNTHESIS, MAMMO: HCPCS | Performed by: RADIOLOGY

## 2020-01-28 PROCEDURE — 90836 PSYTX W PT W E/M 45 MIN: CPT | Performed by: NURSE PRACTITIONER

## 2020-01-28 PROCEDURE — 99213 OFFICE O/P EST LOW 20 MIN: CPT | Performed by: NURSE PRACTITIONER

## 2020-01-28 PROCEDURE — 77066 DX MAMMO INCL CAD BI: CPT | Performed by: RADIOLOGY

## 2020-01-28 PROCEDURE — G0279 TOMOSYNTHESIS, MAMMO: HCPCS

## 2020-01-28 RX ORDER — DULOXETIN HYDROCHLORIDE 30 MG/1
30 CAPSULE, DELAYED RELEASE ORAL DAILY
Qty: 30 CAPSULE | Refills: 5 | Status: SHIPPED | OUTPATIENT
Start: 2020-01-28 | End: 2020-08-18

## 2020-01-28 NOTE — PROGRESS NOTES
Subjective   Kayleen Nassar is a 69 y.o. female who is here today for medication management follow up. and therapy     Chief Complaint: grief depression, insomnia RADHA    History of Present Illness Patient presents by herself for session.  She reports having her mammogram this morning and had been very anxious about it prior to it.  She states it went quickly so feels it was probably okay and gets another one in 6 months.  She should hear from them within a week.  Patient states she is tolerating Cymbalta 30 mg 1 p.o. daily this past week prior to that she had had some nausea.  She is also getting on the treadmill for up to 20 minutes daily which she feels has also been helpful.  Several days out of the week though she does not feel like doing anything has intermittent crying and feels just very sad she states over the the death of her brother who she was very close to.  She talks to her sister often and they visit living in the same town.  Unfortunately her sister's  has terminal esophageal gastric cancer.  Patient states she is lost so many to death siblings parents and in-laws over the past 2 years it is been very difficult.  Trazodone 50 mg was too sedating the following day.  Encouraged patient to try a half a tablet and if that is still too sedating to call back and let me know.  Patient rates depression about a 6 instead of a 9 and anxiety of 5-6.  She states she worries about her great-grandchildren and worries about her grandchildren and worries about her own children and she just cannot control things or fix them..  Denies adverse effects from medications.   (Scales based on 0 - 10 with 10 being the worst)    Therapy:  Start Time: 3 PM    Stop Time: 3:45 PM     (45 minutes) minutes was spent for psychotherapy. Assisted patient in processing patient's general anxiety, depression complicated grief insomnia. Acknowledged and normalized patient's thoughts, feelings, and concerns. Utilized  cognitive behavioral therapy to assist the patient in recognizing more appropriate coping mechanisms when she becomes agitated/anxious depressed which are proven effective in reducing the severity of frequency of symptoms.     CLINICAL MANUEVERING/INTERVENTION:   Patient talked about current stressors, primarily having a difficult time dealing with health and others illnesses.  She also is very sad regarding her brother's death last November 2019.  It is only been about 9 weeks and processed grieving,. Feelings were processed and validated, both negative and positive. Flushing out worries and concerns was conducted in order to diminish emotional tension regarding her great grandson whose mother is not nurturing and she cared for the 4-year-old for about 5 days last week.. Processing what she can control and what she cannot and what does she do with that stress.  Ways in which patient may take stress off herself in a purposeful manner was discussed.  Patient loves to read is now exercising and encouraged small social engagements.  Patient was assisted in 'talking out' what she may do if health continues to be a challenge.  Discussed things that she does enjoy such as being around her grandchildren great-grandchildren and reading.  Loaned patient a book on grief recovery. Sleep hygiene was reviewed and encouraged and relaxation techniques were reviewed and practiced.  The patient expressed gratitude for today's session and said that counseling helps her feel better.      The following portions of the patient's history were reviewed and updated as appropriate: allergies, current medications, past family history, past medical history, past social history, past surgical history and problem list.    Review of Systems  A 14 point review of systems was performed and is negative except as noted above.    Objective   Physical Exam  not currently breastfeeding.    No Known Allergies    Current Medications:   Current Outpatient  Medications   Medication Sig Dispense Refill   • ALPRAZolam (XANAX) 0.25 MG tablet Take one tablet as needed for severe anxiety once a day 30 tablet 0   • bisoprolol-hydrochlorothiazide (ZIAC) 5-6.25 MG per tablet      • DULoxetine (CYMBALTA) 30 MG capsule Take 1 capsule by mouth Daily. 30 capsule 5   • lisinopril (PRINIVIL,ZESTRIL) 10 MG tablet      • pantoprazole (PROTONIX) 40 MG EC tablet      • tamoxifen (NOLVADEX) 20 MG chemo tablet      • traZODone (DESYREL) 50 MG tablet Take 1 tablet by mouth Every Night. 30 tablet 1     No current facility-administered medications for this visit.          RADHA-7:    Over the last two weeks, how often have you been bothered by the following problems?  Feeling nervous, anxious or on edge: More than half the days  Not being able to stop or control worrying: Several days  Worrying too much about different things: More than half the days  Trouble Relaxing: Not at all  Being so restless that it is hard to sit still: Not at all  Becoming easily annoyed or irritable: Several days  Feeling afraid as if something awful might happen: More than half the days  RADHA 7 Total Score: 8  0-4: Minimal anxiety  5-9: Mild anxiety  10-14: Moderate anxiety  15-21: Severe anxiety    PHQ-9:  PHQ-2/PHQ-9 Depression Screening 1/28/2020   Little interest or pleasure in doing things 2   Feeling down, depressed, or hopeless 3   Trouble falling or staying asleep, or sleeping too much 2   Feeling tired or having little energy 3   Poor appetite or overeating 0   Feeling bad about yourself - or that you are a failure or have let yourself or your family down 2   Trouble concentrating on things, such as reading the newspaper or watching television 1   Moving or speaking so slowly that other people could have noticed. Or the opposite - being so fidgety or restless that you have been moving around a lot more than usual 2   Thoughts that you would be better off dead, or of hurting yourself in some way 0   Total  Score 15   If you checked off any problems, how difficult have these problems made it for you to do your work, take care of things at home, or get along with other people? Very difficult      5-9: Minimal symptoms  10-14: Major depression mild  15-19: Major depression moderate  Greater then 20: Major depression severe    Appearance: appropriate  Hygiene:   good  Cooperation:  Cooperative  Eye Contact:  Good  Psychomotor Behavior:  No psychomotor agitation/retardation, No EPS, No motor tics  Mood: sad  Affect:  Appropriate  Hopelessness: Denies  Speech:  Normal  Thought Process:  Linear  Thought Content:  Normal  Concentration: Normal   Suicidal:  None  Homicidal:  None  Hallucinations:  None  Delusion:  None  Memory:  Intact  Orientation:  Person, Place, Time and Situation  Reliability:  good  Insight:  Fair  Judgement: good  Impulse Control: good  Estimated Intelligence: average range        Assessment/Plan   Diagnoses and all orders for this visit:    Complicated grief    Generalized anxiety disorder    Insomnia due to mental condition    Moderate episode of recurrent major depressive disorder (CMS/HCC)    Other orders  -     DULoxetine (CYMBALTA) 30 MG capsule; Take 1 capsule by mouth Daily.      IMPRESSION: Tolerating duloxetine 30 mg 1 daily for depression and anxiety, trazodone too sedating the following day    PLAN: Decrease trazodone to 25 mg at bedtime no later than 9 PM to take medication and see if that will decrease drowsiness the following day.  Refill duloxetine 30 mg 1 p.o. daily for depression and anxiety  Patient rare use of Xanax took it before her  mammogram and also half tablet yesterday as she was very anxious about mammogram  Loaned patient a book on grief recovery to read as she enjoys reading.  We discussed risks, benefits, and side effects of the above medications and the patient was agreeable with the plan. Patient was educated on the importance of compliance with treatment and follow-up  appointments.     Provide Cognitive Behavioral Therapy and Solution Focused Therapy to improve functioning, maintain stability, and avoid decompensation and the need for higher level of care.    Counseled patient regarding multimodal approach with encouragement of healthy nutrition, healthy sleep, regular physical mobility, social involvement, counseling, and medication compliance.     Assisted patient in identifying risk factors which would indicate the need for higher level of care including thoughts to harm self or others and/or self-harming behavior and encouraged patient to contact this office, call 911, or present to the nearest emergency room should any of these events occur. Discussed crisis intervention services and means to access.  Patient adamantly and convincingly denies current suicidal or homicidal ideation or perceptual disturbance.    Treatment Plan: stabilize mood, patient will stay out of psychiatric hospital and be at optimal level of functioning with therapy and take all medication as prescribed. Patient verbalized  understanding and agreement to plan.    Instructed to call for questions or concerns and return early if necessary.     Return in about 2 months (around 4/6/2020).

## 2020-02-04 ENCOUNTER — OFFICE VISIT (OUTPATIENT)
Dept: ONCOLOGY | Facility: CLINIC | Age: 70
End: 2020-02-04

## 2020-02-04 VITALS
RESPIRATION RATE: 12 BRPM | BODY MASS INDEX: 31.8 KG/M2 | WEIGHT: 162 LBS | HEIGHT: 60 IN | TEMPERATURE: 97.6 F | OXYGEN SATURATION: 97 % | HEART RATE: 77 BPM | SYSTOLIC BLOOD PRESSURE: 112 MMHG | DIASTOLIC BLOOD PRESSURE: 67 MMHG

## 2020-02-04 DIAGNOSIS — C50.212 MALIGNANT NEOPLASM OF UPPER-INNER QUADRANT OF LEFT BREAST IN FEMALE, ESTROGEN RECEPTOR POSITIVE (HCC): Primary | ICD-10-CM

## 2020-02-04 DIAGNOSIS — Z17.0 MALIGNANT NEOPLASM OF UPPER-INNER QUADRANT OF LEFT BREAST IN FEMALE, ESTROGEN RECEPTOR POSITIVE (HCC): Primary | ICD-10-CM

## 2020-02-04 PROCEDURE — 99213 OFFICE O/P EST LOW 20 MIN: CPT | Performed by: INTERNAL MEDICINE

## 2020-02-04 NOTE — PROGRESS NOTES
"      PROBLEM LIST:  1. [id number] (Stage IA) ER+ [place]+ Her2 negative invasive ductal carcinoma of the left breast  A) left lumpectomy on 11/8/18 showed a grade 2 IDC measuring 6 mm.  On 12/6/18 sentinal LN excision showed 0/2 nodes involved.  Adjuvant radiation completed 3/12/19.  Tamoxifen started April 2019.  2. Hypertension  3. Depression      Subjective     CHIEF COMPLAINT: Breast Cancer    HISTORY OF PRESENT ILLNESS:   Kayleen Laughlinpard returns for follow-up.  She has been seeing Nubia Escobar and this has been helpful.  She is taking Cymbalta.  She says she has trouble going to sleep at night but any sleep medications make her feel drowsy the next morning.  She said the past year has been very difficult as she lost several family members.  She has a lot of anger and distrust about the medical care that her family members received.  She is continuing to take tamoxifen.  The muscle cramps that she had previously had improved although she still has them rarely.          Past Medical History, Past Surgical History, Social History, Family History have been reviewed and are without significant changes except as mentioned.    Review of Systems   A comprehensive 14 point review of systems was performed and was negative except as mentioned.    Medications:  The current medication list was reviewed in the EMR    ALLERGIES:  No Known Allergies    Objective      /67   Pulse 77   Temp 97.6 °F (36.4 °C) (Temporal)   Resp 12   Ht 152.4 cm (60\")   Wt 73.5 kg (162 lb)   SpO2 97%   BMI 31.64 kg/m²      Performance Status: 0    General: well appearing female in no acute distress  Neuro: alert and oriented  HEENT: sclera anicteric, oropharynx clear  Lymphatics: no cervical, supraclavicular, or axillary adenopathy  Cardiovascular: regular rate and rhythm, no murmurs  Lungs: clear to auscultation bilaterally  Abdomen: soft, nontender, nondistended.  No palpable organomegaly  Extremeties: no lower extremity edema  Skin: no " rashes, lesions, bruising, or petechiae  Psych: mood and affect appropriate      RECENT LABS:        Lab Results   Component Value Date    HGB 12.9 10/22/2019    HCT 40.0 10/22/2019    MCV 93.2 10/22/2019     10/22/2019    WBC 6.53 10/22/2019    NEUTROABS 3.89 10/22/2019    LYMPHSABS 2.08 10/22/2019    MONOSABS 0.44 10/22/2019    EOSABS 0.03 10/22/2019    BASOSABS 0.07 10/22/2019     Lab Results   Component Value Date    GLUCOSE 101 (H) 10/22/2019    BUN 15 10/22/2019    CREATININE 0.98 10/22/2019     10/22/2019    K 4.2 10/22/2019     10/22/2019    CO2 22.2 10/22/2019    CALCIUM 9.2 10/22/2019    PROTEINTOT 7.4 10/22/2019    ALBUMIN 4.00 10/22/2019    BILITOT 0.2 10/22/2019    ALKPHOS 70 10/22/2019    AST 21 10/22/2019    ALT 13 10/22/2019           Assessment/Plan   Kayleen Nassar is a 69 y.o. year old female with a stage IA ER+ Her2 negative IDC of the left breast.  She is doing fairly well on tamoxifen and has had improvement in the muscle cramps now that she has been on it longer.    Depression: on cymbalta.  F/u with Nubia Escobar.    Fatigue: I would recommend regular exercise.    Follow-up in 6 months.            I spent a total of  15 minutes in direct patient care, greater than  15   minutes (greater than 50%) were spent in coordination of care, and counseling the patient regarding breast cancer, depression, and weight managment .  I answered any questions patient had with medication and plan.       Aida Zavaleta MD  [facility]    2/4/2020          CC:

## 2020-04-06 ENCOUNTER — OFFICE VISIT (OUTPATIENT)
Dept: PSYCHIATRY | Facility: CLINIC | Age: 70
End: 2020-04-06

## 2020-04-06 DIAGNOSIS — F41.1 GENERALIZED ANXIETY DISORDER: ICD-10-CM

## 2020-04-06 DIAGNOSIS — F33.1 MODERATE EPISODE OF RECURRENT MAJOR DEPRESSIVE DISORDER (HCC): Primary | ICD-10-CM

## 2020-04-06 PROCEDURE — 90833 PSYTX W PT W E/M 30 MIN: CPT | Performed by: NURSE PRACTITIONER

## 2020-04-06 PROCEDURE — 99212 OFFICE O/P EST SF 10 MIN: CPT | Performed by: NURSE PRACTITIONER

## 2020-04-06 NOTE — PROGRESS NOTES
"  Subjective   Kayleen Nassar is a 69 y.o. female who is here today for medication management follow up. You have chosen to receive care through a telephone visit today. Do you consent to use a telephone visit for your medical care today? Yes    Chief Complaint: MDD recurrent mod, RADHA    History of Present Illness Patient via telephone encounter reports she still has days she has now motivation, low interest, low energy even though she sleeps a lot. She states she doesn't want to get dressed some days. She does push herself to get dressed and do things. Other days she states she has more energy and interest and pushes herself to complete chores and tasks around the house. She and her  are social distancing and staying home. She states she isn't a computer savvy person so doesn't see her grandchildren and has to stay away from them. She talks with family members and stays engaged that way. Talks with her sisters almost daily. Anxiety rated 3-4. Depression a 6  . Not needing xanax she states and not taking trazodone, \"I sleep too much don't need it\".  Denies adverse effects from medications.   (Scales based on 0 - 10 with 10 being the worst)    Therapy:  Start Time: 12:45   Stop Time:  1:15   (30 ) minutes was spent for psychotherapy. Assisted patient in processing patient's MDD, RADHA. Acknowledged and normalized patient's thoughts, feelings, and concerns. Utilized cognitive behavioral therapy to assist the patient in recognizing more appropriate coping mechanisms when she becomes agitated/anxious which are proven effective in reducing the severity of frequency of symptoms.     CLINICAL MANUEVERING/INTERVENTION:   Patient talked about current stressors, primarily having a difficult time dealing with sheltering because of Covid-19 pandemic and having to stay away from family. Venting of frustrations was conducted. Feelings were processed and validated, both negative and positive regarding this. Flushing out " worries and concerns was conducted in order to diminish emotional tension for herself and . Reports she is compliant with her medications. Ways in which patient may take stress off herself in a purposeful manner was discussed and coping skills with low energy, depressive symptoms. Patient discussed concerns about economy and their savings tied to stock. Processed this with pt. The patient expressed gratitude for today's session and said that counseling helps her feel better.      The following portions of the patient's history were reviewed and updated as appropriate: allergies, current medications, past family history, past medical history, past social history, past surgical history and problem list.    Review of Systems  A 14 point review of systems was performed and is negative except as noted above.    Objective   Physical Exam  not currently breastfeeding.    No Known Allergies    Current Medications:   Current Outpatient Medications   Medication Sig Dispense Refill   • ALPRAZolam (XANAX) 0.25 MG tablet Take one tablet as needed for severe anxiety once a day 30 tablet 0   • bisoprolol-hydrochlorothiazide (ZIAC) 5-6.25 MG per tablet      • DULoxetine (CYMBALTA) 30 MG capsule Take 1 capsule by mouth Daily. 30 capsule 5   • lisinopril (PRINIVIL,ZESTRIL) 10 MG tablet      • pantoprazole (PROTONIX) 40 MG EC tablet      • tamoxifen (NOLVADEX) 20 MG chemo tablet      • traZODone (DESYREL) 50 MG tablet Take 1 tablet by mouth Every Night. 30 tablet 1     No current facility-administered medications for this visit.      RADHA-7:    Over the last two weeks, how often have you been bothered by the following problems?  Feeling nervous, anxious or on edge: Several days  Not being able to stop or control worrying: Not at all  Worrying too much about different things: Not at all  Trouble Relaxing: Not at all  Being so restless that it is hard to sit still: Not at all  Becoming easily annoyed or irritable: Not at  all  Feeling afraid as if something awful might happen: Several days(with Covid 19 concerned for others)  RADHA 7 Total Score: 2  If you checked any problems, how difficult have these problems made it for you to do your work, take care of things at home, or get along with other people: Not difficult at all  0-4: Minimal anxiety  5-9: Mild anxiety  10-14: Moderate anxiety  15-21: Severe anxiety  PHQ-9:  PHQ-2/PHQ-9 Depression Screening 4/6/2020   Little interest or pleasure in doing things 2   Feeling down, depressed, or hopeless 2   Trouble falling or staying asleep, or sleeping too much 2   Feeling tired or having little energy 3   Poor appetite or overeating 0   Feeling bad about yourself - or that you are a failure or have let yourself or your family down 0   Trouble concentrating on things, such as reading the newspaper or watching television 0   Moving or speaking so slowly that other people could have noticed. Or the opposite - being so fidgety or restless that you have been moving around a lot more than usual 0   Thoughts that you would be better off dead, or of hurting yourself in some way 0   Total Score 9   If you checked off any problems, how difficult have these problems made it for you to do your work, take care of things at home, or get along with other people? -      5-9: Minimal symptoms  10-14: Major depression mild  15-19: Major depression moderate  Greater then 20: Major depression severe    Appearance: na  Hygiene:   na  Cooperation:  Cooperative  Eye Contact: na  Psychomotor Behavior:  na  Mood: depressive symptoms  Affect: na  Hopelessness: Denies  Speech:  Normal  Thought Process:  Linear  Thought Content:  Normal  Concentration: Normal   Suicidal:  None  Homicidal:  None  Hallucinations:  None  Delusion:  None  Memory:  Intact  Orientation:  Person, Place, Time and Situation  Reliability:  good  Insight:  Fair to good  Judgement: good  Impulse Control: good  Estimated Intelligence: average  range      Assessment/Plan   Diagnoses and all orders for this visit:    Moderate episode of recurrent major depressive disorder (CMS/HCC)    Generalized anxiety disorder      IMPRESSION: cont depressive symptoms, anxiety lower    PLAN: increase duloxetine to 60 mg daily, she just filled a 90 day supply of Cymbalta 30mg so instructed to take two a day    We discussed risks, benefits, and side effects of the above medications and the patient was agreeable with the plan. Patient was educated on the importance of compliance with treatment and follow-up appointments.     Provide Cognitive Behavioral Therapy and Solution Focused Therapy to improve functioning, maintain stability, and avoid decompensation and the need for higher level of care.    Encouraged patient regarding multimodal approach with encouragement of healthy nutrition, healthy sleep, regular physical mobility, social involvement, counseling, and medication compliance.     Assisted patient in identifying risk factors which would indicate the need for higher level of care including thoughts to harm self or others and/or self-harming behavior and encouraged patient to contact this office, call 911, or present to the nearest emergency room should any of these events occur. Discussed crisis intervention services and means to access.  Patient adamantly and convincingly denies current suicidal or homicidal ideation or perceptual disturbance.    Treatment Plan: stabilize mood, patient will stay out of psychiatric hospital and be at optimal level of functioning with therapy and take all medication as prescribed. Patient verbalized  understanding and agreement to plan.    Instructed to call for questions or concerns and return early if necessary.     Return in about 3 weeks (around 4/27/2020). by phone

## 2020-05-15 ENCOUNTER — DOCUMENTATION (OUTPATIENT)
Dept: ONCOLOGY | Facility: CLINIC | Age: 70
End: 2020-05-15

## 2020-07-29 ENCOUNTER — OFFICE VISIT (OUTPATIENT)
Dept: OBSTETRICS AND GYNECOLOGY | Facility: CLINIC | Age: 70
End: 2020-07-29

## 2020-07-29 ENCOUNTER — HOSPITAL ENCOUNTER (OUTPATIENT)
Dept: MAMMOGRAPHY | Facility: HOSPITAL | Age: 70
Discharge: HOME | End: 2020-07-29
Admitting: NURSE PRACTITIONER

## 2020-07-29 VITALS
WEIGHT: 157 LBS | DIASTOLIC BLOOD PRESSURE: 70 MMHG | SYSTOLIC BLOOD PRESSURE: 112 MMHG | BODY MASS INDEX: 30.82 KG/M2 | HEIGHT: 60 IN

## 2020-07-29 DIAGNOSIS — Z01.411 ENCOUNTER FOR GYNECOLOGICAL EXAMINATION WITH ABNORMAL FINDING: Primary | ICD-10-CM

## 2020-07-29 DIAGNOSIS — M85.89 OSTEOPENIA OF MULTIPLE SITES: ICD-10-CM

## 2020-07-29 DIAGNOSIS — R92.8 ABNORMAL MAMMOGRAM: ICD-10-CM

## 2020-07-29 DIAGNOSIS — N39.3 SUI (STRESS URINARY INCONTINENCE, FEMALE): ICD-10-CM

## 2020-07-29 PROBLEM — D12.6 ADENOMATOUS POLYP OF COLON: Status: RESOLVED | Noted: 2018-04-17 | Resolved: 2020-07-29

## 2020-07-29 PROBLEM — Z85.3 HISTORY OF BREAST CANCER: Status: RESOLVED | Noted: 2018-12-16 | Resolved: 2020-07-29

## 2020-07-29 PROBLEM — M85.80 OSTEOPENIA DETERMINED BY X-RAY: Status: RESOLVED | Noted: 2018-04-27 | Resolved: 2020-07-29

## 2020-07-29 PROBLEM — Z90.710 HX OF ABDOMINAL HYSTERECTOMY: Status: RESOLVED | Noted: 2018-04-03 | Resolved: 2020-07-29

## 2020-07-29 PROCEDURE — 77065 DX MAMMO INCL CAD UNI: CPT

## 2020-07-29 PROCEDURE — 77065 DX MAMMO INCL CAD UNI: CPT | Performed by: RADIOLOGY

## 2020-07-29 PROCEDURE — G0101 CA SCREEN;PELVIC/BREAST EXAM: HCPCS | Performed by: OBSTETRICS & GYNECOLOGY

## 2020-07-29 NOTE — PROGRESS NOTES
DataSubjective   Chief Complaint   Patient presents with   • Annual Exam     Kayleen Nassar is a 69 y.o. year old  who is post-menopausal.  She is S/P hysterectomy presenting to be seen for her annual exam.  This past year she has not been on hormone replacement therapy.  There has not been vaginal bleeding in the last 12 months.  Menopausal symptoms are not present.    SEXUAL Hx:  She is currently sexually active.    [place] is painful: no              She has concerns about domestic violence no    HEALTH Hx:  Level of weekly physical activity: 2 hours  She wears her seat belt: yes.  Self breast awareness: yes  Calcium servings per day: 3+   Caffeine intake: < 1 equivalent to a cup of coffee  Social History     Substance and Sexual Activity   Alcohol Use No                 Social History    Tobacco Use      Smoking status: Never Smoker      Smokeless tobacco: Never Used      The following portions of the patient's history were reviewed and updated as appropriate:problem list, current medications, allergies, past family history, past medical history, past social history and past surgical history    Current Outpatient Medications:   •  ALPRAZolam (XANAX) 0.25 MG tablet, Take one tablet as needed for severe anxiety once a day, Disp: 30 tablet, Rfl: 0  •  bisoprolol-hydrochlorothiazide (ZIAC) 5-6.25 MG per tablet, , Disp: , Rfl:   •  DULoxetine (CYMBALTA) 30 MG capsule, Take 1 capsule by mouth Daily., Disp: 30 capsule, Rfl: 5  •  lisinopril (PRINIVIL,ZESTRIL) 10 MG tablet, , Disp: , Rfl:   •  pantoprazole (PROTONIX) 40 MG EC tablet, , Disp: , Rfl:   •  tamoxifen (NOLVADEX) 20 MG chemo tablet, , Disp: , Rfl:   •  traZODone (DESYREL) 50 MG tablet, Take 1 tablet by mouth Every Night., Disp: 30 tablet, Rfl: 1    Review of Systems  Constitutional POS: nothing reported    NEG: anorexia, fevers, night sweats or weight gain   Genitourinary POS: STAS is present but it IS NOT effecting her ADL's    NEG: dysuria,  frequency or hematuria   Gastointestinal POS: nothing reported    NEG: bloating, change in bowel habits, melena or reflux symptoms   Breast                       POS: nothing reported  NEG: persistent breast lump, skin dimpling, breast tenderness or nipple discharge                    Objective   There were no vitals taken for this visit.    General:  well developed; well nourished  no acute distress  appears stated age   Skin:  No suspicious lesions seen   Thyroid: not examined   Breasts:  Examined in supine position  Symmetric without masses or skin dimpling  Nipples normal without inversion, lesions or discharge  There are no palpable axillary nodes  Fibrocystic changes are present both breasts without a discrete mass  Scar medial and lateral left breast   Abdomen: soft, non-tender; no masses  no umbilical or inguinal hernias are present  no hepato-splenomegaly   Pelvis: Clinical staff was present for exam  External genitalia:  normal appearance of the external genitalia including Bartholin's and Brundage's glands.  :  urethral meatus normal;  Vaginal:  atrophic mucosal changes are present; she is able to perform a Kegel contraction upon request;  Uterus:  absent.  Adnexa:  non palpable bilaterally.  Rectal:  digital rectal exam not performed; anus visually normal appearing.       Lab and Imaging Review   UA  DEXA  Mammogram report       Assessment        1. Gynecologic, breast and colorectal screening protocols were reviewed.  2. Postmenopausal examination with stress urinary incontinence.  This is not intolerable to her.  Gave her options of either potential pessary with bolstered support to compress urethra or even surgery.  She is not interested in either at the current time.  3. Osteopenia with adequate calcium but I do not know that she is exercising quite as much as I would like.       Plan     1. The importance of keeping all planned follow-up and taking all medications as prescribed was  emphasized.  2. Continue timed voiding and try Kegel's 5 minutes a day and as needed.  3. Self breast awareness and mammogram protocols discussed.  4. Regular exercise and calcium ( ideally dietary) discussed will give diet information sheet encouraged daily walking if possible  5. Follow up for annual exam or PRN   6.     No orders of the defined types were placed in this encounter.    No orders of the defined types were placed in this encounter.           This note was electronically signed.    Amado Andrade MD  July 29, 2020    Note: Speech recognition transcription software may have been used to create portions of this document.  An attempt at proofreading has been made but errors in transcription could still be present.

## 2020-08-11 ENCOUNTER — OFFICE VISIT (OUTPATIENT)
Dept: ONCOLOGY | Facility: CLINIC | Age: 70
End: 2020-08-11

## 2020-08-11 VITALS
HEART RATE: 88 BPM | HEIGHT: 60 IN | OXYGEN SATURATION: 98 % | RESPIRATION RATE: 16 BRPM | SYSTOLIC BLOOD PRESSURE: 128 MMHG | BODY MASS INDEX: 30.82 KG/M2 | WEIGHT: 157 LBS | DIASTOLIC BLOOD PRESSURE: 70 MMHG | TEMPERATURE: 97.3 F

## 2020-08-11 DIAGNOSIS — C50.212 MALIGNANT NEOPLASM OF UPPER-INNER QUADRANT OF LEFT BREAST IN FEMALE, ESTROGEN RECEPTOR POSITIVE (HCC): Primary | ICD-10-CM

## 2020-08-11 DIAGNOSIS — Z17.0 MALIGNANT NEOPLASM OF UPPER-INNER QUADRANT OF LEFT BREAST IN FEMALE, ESTROGEN RECEPTOR POSITIVE (HCC): Primary | ICD-10-CM

## 2020-08-11 PROCEDURE — 99213 OFFICE O/P EST LOW 20 MIN: CPT | Performed by: NURSE PRACTITIONER

## 2020-08-11 RX ORDER — TAMOXIFEN CITRATE 20 MG/1
20 TABLET ORAL DAILY
Qty: 90 TABLET | Refills: 2 | Status: SHIPPED | OUTPATIENT
Start: 2020-08-11 | End: 2021-03-09 | Stop reason: SDUPTHER

## 2020-08-11 NOTE — PROGRESS NOTES
"      PROBLEM LIST:  1. [id number] (Stage IA) ER+ VA+ Her2 negative invasive ductal carcinoma of the left breast  A) left lumpectomy on 11/8/18 showed a grade 2 IDC measuring 6 mm.  On 12/6/18 sentinal LN excision showed 0/2 nodes involved.  Adjuvant radiation completed 3/12/19.  Tamoxifen started April 2019.  2. Hypertension  3. Depression      Subjective     CHIEF COMPLAINT: Breast Cancer    HISTORY OF PRESENT ILLNESS:   Kayleen Laughlinpard returns for follow-up.  She continues to see Nubia Escobar. She says this has helped. She lost 2 brothers and 3 brother-in-laws in the past year. She is taking Cymbalta.  She says her sleep has improved.  She is continuing to take tamoxifen.  She has been having muscle cramps in her lower legs. This happens mainly at night. She has not had recent labs.         Past Medical History, Past Surgical History, Social History, Family History have been reviewed and are without significant changes except as mentioned.    Review of Systems   A comprehensive 14 point review of systems was performed and was negative except as mentioned.    Medications:  The current medication list was reviewed in the EMR    ALLERGIES:  No Known Allergies    Objective      /70   Pulse 88   Temp 97.3 °F (36.3 °C) (Temporal)   Resp 16   Ht 151.1 cm (59.5\")   Wt 71.2 kg (157 lb)   SpO2 98%   BMI 31.18 kg/m²      Performance Status: 0    General: well appearing female in no acute distress  Neuro: alert and oriented  HEENT: sclera anicteric, oropharynx clear  Lymphatics: no cervical, supraclavicular, or axillary adenopathy  Cardiovascular: regular rate and rhythm, no murmurs  Lungs: clear to auscultation bilaterally  Abdomen: soft, nontender, nondistended.  No palpable organomegaly  Extremeties: no lower extremity edema  Skin: no rashes, lesions, bruising, or petechiae  Psych: mood and affect appropriate      RECENT LABS:        Lab Results   Component Value Date    HGB 12.9 10/22/2019    HCT 40.0 " 10/22/2019    MCV 93.2 10/22/2019     10/22/2019    WBC 6.53 10/22/2019    NEUTROABS 3.89 10/22/2019    LYMPHSABS 2.08 10/22/2019    MONOSABS 0.44 10/22/2019    EOSABS 0.03 10/22/2019    BASOSABS 0.07 10/22/2019     Lab Results   Component Value Date    GLUCOSE 101 (H) 10/22/2019    BUN 15 10/22/2019    CREATININE 0.98 10/22/2019     10/22/2019    K 4.2 10/22/2019     10/22/2019    CO2 22.2 10/22/2019    CALCIUM 9.2 10/22/2019    PROTEINTOT 7.4 10/22/2019    ALBUMIN 4.00 10/22/2019    BILITOT 0.2 10/22/2019    ALKPHOS 70 10/22/2019    AST 21 10/22/2019    ALT 13 10/22/2019           Assessment/Plan   Kayleen Nassar is a 69 y.o. year old female with a stage IA ER+ Her2 negative IDC of the left breast.  She is doing fairly well on tamoxifen. She has reoccurrence of muscle cramps lately. We discussed that we can check labs today to rule out low potassium or dehydration. She has been working outside a lot and is unsure how much water intake she has had. She declined. She is due to follow up with PCP and will wait to have labs drawn when she him.     Depression: on cymbalta.  Continue to follow up with Nubia Escobar.    Fatigue: Continue regular exercise.    Follow-up in 6 months.            I spent a total of  20 minutes in direct patient care, greater than  15   minutes (greater than 50%) were spent in coordination of care, and counseling the patient regarding breast cancer, depression, and weight managment .  I answered any questions patient had with medication and plan.       Isi Gonzalez APRN  [facility]    8/11/2020          CC:

## 2020-08-18 RX ORDER — DULOXETIN HYDROCHLORIDE 60 MG/1
60 CAPSULE, DELAYED RELEASE ORAL DAILY
Qty: 90 CAPSULE | Refills: 1 | Status: SHIPPED | OUTPATIENT
Start: 2020-08-18 | End: 2021-07-30

## 2020-11-11 NOTE — TELEPHONE ENCOUNTER
----- Message from Ericka Garcia sent at 10/3/2019  8:16 AM EDT -----  Regarding: Willy PT   Patient mentioned needing a 6 month mammogram in a voicemail that she left for me.     Thanks!     Ericka     no

## 2021-01-11 ENCOUNTER — TRANSCRIBE ORDERS (OUTPATIENT)
Dept: ADMINISTRATIVE | Facility: HOSPITAL | Age: 71
End: 2021-01-11

## 2021-01-11 DIAGNOSIS — R92.8 ABNORMAL MAMMOGRAM: Primary | ICD-10-CM

## 2021-02-11 ENCOUNTER — APPOINTMENT (OUTPATIENT)
Dept: MAMMOGRAPHY | Facility: HOSPITAL | Age: 71
End: 2021-02-11

## 2021-02-24 ENCOUNTER — HOSPITAL ENCOUNTER (OUTPATIENT)
Dept: MAMMOGRAPHY | Facility: HOSPITAL | Age: 71
Discharge: HOME | End: 2021-02-24
Admitting: OBSTETRICS & GYNECOLOGY

## 2021-02-24 DIAGNOSIS — R92.8 ABNORMAL MAMMOGRAM: ICD-10-CM

## 2021-02-24 PROCEDURE — 77066 DX MAMMO INCL CAD BI: CPT | Performed by: RADIOLOGY

## 2021-02-24 PROCEDURE — 77066 DX MAMMO INCL CAD BI: CPT

## 2021-02-24 PROCEDURE — G0279 TOMOSYNTHESIS, MAMMO: HCPCS | Performed by: RADIOLOGY

## 2021-02-24 PROCEDURE — G0279 TOMOSYNTHESIS, MAMMO: HCPCS

## 2021-03-09 ENCOUNTER — OFFICE VISIT (OUTPATIENT)
Dept: ONCOLOGY | Facility: CLINIC | Age: 71
End: 2021-03-09

## 2021-03-09 VITALS
TEMPERATURE: 97.3 F | OXYGEN SATURATION: 96 % | BODY MASS INDEX: 30.23 KG/M2 | HEIGHT: 60 IN | HEART RATE: 103 BPM | WEIGHT: 154 LBS | RESPIRATION RATE: 12 BRPM | SYSTOLIC BLOOD PRESSURE: 123 MMHG | DIASTOLIC BLOOD PRESSURE: 77 MMHG

## 2021-03-09 DIAGNOSIS — Z17.0 MALIGNANT NEOPLASM OF UPPER-INNER QUADRANT OF LEFT BREAST IN FEMALE, ESTROGEN RECEPTOR POSITIVE (HCC): Primary | ICD-10-CM

## 2021-03-09 DIAGNOSIS — C50.212 MALIGNANT NEOPLASM OF UPPER-INNER QUADRANT OF LEFT BREAST IN FEMALE, ESTROGEN RECEPTOR POSITIVE (HCC): Primary | ICD-10-CM

## 2021-03-09 DIAGNOSIS — F33.1 MODERATE EPISODE OF RECURRENT MAJOR DEPRESSIVE DISORDER (HCC): ICD-10-CM

## 2021-03-09 PROCEDURE — 99214 OFFICE O/P EST MOD 30 MIN: CPT | Performed by: NURSE PRACTITIONER

## 2021-03-09 RX ORDER — TAMOXIFEN CITRATE 20 MG/1
20 TABLET ORAL DAILY
Qty: 90 TABLET | Refills: 2 | Status: SHIPPED | OUTPATIENT
Start: 2021-03-09 | End: 2021-12-15

## 2021-03-09 NOTE — PROGRESS NOTES
"      PROBLEM LIST:  1. [id number] (Stage IA) ER+ OK+ Her2 negative invasive ductal carcinoma of the left breast  A) left lumpectomy on 11/8/18 showed a grade 2 IDC measuring 6 mm.  On 12/6/18 sentinal LN excision showed 0/2 nodes involved.  Adjuvant radiation completed 3/12/19.  Tamoxifen started April 2019.  2. Hypertension  3. Depression      Subjective     CHIEF COMPLAINT: Breast Cancer    HISTORY OF PRESENT ILLNESS:   Kayleen Nassar returns for follow-up.  She has not seen Nubia Escobar lately.  Continues on Cymbalta.  Only uses Xanax as needed and that is sparingly.  She is sleeping much better.  She is continuing to take tamoxifen.  She feels like since the weather has changed she feels much better with the sunshine.  Denies headaches.  Denies body aches.  No hot flashes.      Past Medical History, Past Surgical History, Social History, Family History have been reviewed and are without significant changes except as mentioned.    Review of Systems   A comprehensive 14 point review of systems was performed and was negative except as mentioned.    Medications:  The current medication list was reviewed in the EMR    ALLERGIES:  No Known Allergies    Objective      /77   Pulse 103   Temp 97.3 °F (36.3 °C) (Tympanic)   Resp 12   Ht 151.1 cm (59.5\")   Wt 69.9 kg (154 lb)   SpO2 96%   BMI 30.58 kg/m²      Performance Status: 0    General: well appearing female in no acute distress  Neuro: alert and oriented  HEENT: sclera anicteric, oropharynx clear  Lymphatics: no cervical, supraclavicular, or axillary adenopathy  Cardiovascular: regular rate and rhythm, no murmurs  Lungs: clear to auscultation bilaterally  Abdomen: soft, nontender, nondistended.  No palpable organomegaly  Extremeties: no lower extremity edema  Skin: no rashes, lesions, bruising, or petechiae  Psych: mood and affect appropriate      RECENT LABS:        Lab Results   Component Value Date    HGB 12.9 10/22/2019    HCT 40.0 10/22/2019    " MCV 93.2 10/22/2019     10/22/2019    WBC 6.53 10/22/2019    NEUTROABS 3.89 10/22/2019    LYMPHSABS 2.08 10/22/2019    MONOSABS 0.44 10/22/2019    EOSABS 0.03 10/22/2019    BASOSABS 0.07 10/22/2019     Lab Results   Component Value Date    GLUCOSE 101 (H) 10/22/2019    BUN 15 10/22/2019    CREATININE 0.98 10/22/2019     10/22/2019    K 4.2 10/22/2019     10/22/2019    CO2 22.2 10/22/2019    CALCIUM 9.2 10/22/2019    PROTEINTOT 7.4 10/22/2019    ALBUMIN 4.00 10/22/2019    BILITOT 0.2 10/22/2019    ALKPHOS 70 10/22/2019    AST 21 10/22/2019    ALT 13 10/22/2019           Assessment/Plan   Kayleen Nassar is a 70 y.o. year old female with a stage IA ER+ Her2 negative IDC of the left breast.  We discussed diagnostic mammogram showed BI-RADS 3.  Recommended that she have 1 year follow-up with diagnostic mammogram.  She continues to do fairly well on tamoxifen.  I will send tamoxifen to [facility] today.    Depression: Improved on cymbalta.  Continue to follow up with Nubia Escobar.    Anxiety.  Controlled with Xanax.  Continue to follow-up with Nubia Escobar.    Fatigue: I encouraged her to continue regular exercise.    Follow-up in 6 months.              Isi Gonzalez APRN  [facility]ematology [facility] On[facility]    3/9/2021          CC:

## 2021-07-30 ENCOUNTER — TELEPHONE (OUTPATIENT)
Dept: OBSTETRICS AND GYNECOLOGY | Facility: CLINIC | Age: 71
End: 2021-07-30

## 2021-07-30 ENCOUNTER — OFFICE VISIT (OUTPATIENT)
Dept: OBSTETRICS AND GYNECOLOGY | Facility: CLINIC | Age: 71
End: 2021-07-30

## 2021-07-30 VITALS
HEIGHT: 60 IN | BODY MASS INDEX: 31.22 KG/M2 | DIASTOLIC BLOOD PRESSURE: 70 MMHG | SYSTOLIC BLOOD PRESSURE: 118 MMHG | WEIGHT: 159 LBS

## 2021-07-30 DIAGNOSIS — M85.80 OSTEOPENIA, SENILE: ICD-10-CM

## 2021-07-30 DIAGNOSIS — Z12.11 SCREENING FOR MALIGNANT NEOPLASM OF COLON: Primary | ICD-10-CM

## 2021-07-30 DIAGNOSIS — N39.46 URINARY INCONTINENCE, MIXED: Primary | ICD-10-CM

## 2021-07-30 DIAGNOSIS — Z85.3 PERSONAL HISTORY OF BREAST CANCER: ICD-10-CM

## 2021-07-30 PROCEDURE — 99213 OFFICE O/P EST LOW 20 MIN: CPT | Performed by: NURSE PRACTITIONER

## 2021-07-30 RX ORDER — OXYBUTYNIN CHLORIDE 10 MG/1
TABLET, EXTENDED RELEASE ORAL
COMMUNITY
Start: 2021-06-18 | End: 2021-07-30 | Stop reason: ALTCHOICE

## 2021-07-30 NOTE — TELEPHONE ENCOUNTER
Cora,  Can you look and see if you can find a colonoscopy report?  I don't see any in her chart but she says she has had 3 here at [facility] - she couldn't remember when the last one was.  If she is due for one, I will order it for her.  Thanks!

## 2021-07-30 NOTE — PROGRESS NOTES
"  Annual Visit     Patient Name: Kayleen Nassar  : 1950   MRN: [id number]   Care Team: Patient Care Team:  Theo Anguiano MD as PCP - General (Internal Medicine)  Aida Zavaleta MD as Consulting Physician (Hematology and Oncology)  Jung Snyder MD as Referring Physician (General Surgery)  Izabella Tavarez MD as Consulting Physician (Radiation Oncology)    Chief Complaint:    Chief Complaint   Patient presents with   • Urinary Incontinence     better on medication       HPI: Kayleen Nassar is a 70 y.o. year old  presenting to be seen for her gynecologic exam.   Dx mammogram 2021 Birads 3 - needs f/u dx mammogram in 1 yr   Hx left breast cancer - s/p lumpectomy in 2018   She does monthly SBEs   Hx osteopenia - DEXA 2019 rpt 2-3 yrs   She is unsure of last colonoscopy but knows she has had 3 in her lifetime   S/p total hyst   C/o mixed urinary incontinence - sx have worsened in the last yr   She awakens 3-4x at night to void   Has to wear a pad daily now d/t leakage   PCP started oxybutynin 10mg qd 3 months ago until her appt here   Leakage has greatly improved with medication   But she feels she has to strain to completely empty her bladder now   No other side effects of medication         Subjective      /70   Ht 151.1 cm (59.5\")   Wt 72.1 kg (159 lb)   Breastfeeding No   BMI 31.58 kg/m²     BMI reviewed: Body mass index is 31.58 kg/m².      Objective     Physical Exam    Neuro: alert and oriented to person, place and time   General:  alert; cooperative; well developed; well nourished   Skin:  No suspicious lesions seen   Thyroid: normal to inspection and palpation   Lungs:  breathing is unlabored  clear to auscultation bilaterally   Heart:  regular rate and rhythm, S1, S2 normal, no murmur, click, rub or gallop  normal apical impulse   Breasts:  Examined in supine position  Symmetric without masses or skin dimpling  Nipples normal without inversion, lesions or " discharge  There are no palpable axillary nodes   Abdomen: soft, non-tender; no masses  no umbilical or inguinal hernias are present  no hepato-splenomegaly   Pelvis: Clinical staff was present for exam  External genitalia:  normal appearance of the external genitalia including Bartholin's and Bellmore's glands.  :  urethral meatus normal;  Vaginal:  normal pink mucosa without prolapse or lesions.  Cervix:  absent.  Uterus:  absent.  Adnexa:  normal bimanual exam of the adnexa.  Rectal:  digital rectal exam not performed; anus visually normal appearing.         Assessment / Plan      Assessment  Problems Addressed This Visit    ICD-10-CM ICD-9-CM   1. Urinary incontinence, mixed  N39.46 788.33   2. Osteopenia, senile  M85.80 733.90   3. Personal history of breast cancer  Z85.3 V10.3       Plan    1.) Discussed mgmt options   Trial of Mybetriq 25 mg - samples given   If insurance will not cover or if she does not like it, we will change to Oxybutynin 5mg qd - she will call me before samples run out   2.) Desires to wait and rpt DEXA next yr   Cont calcium, 600 mg/ Vit. D, 400 IU daily; regular weight-bearing exercise  3.) Dx mammogram due Feb 2022  Cont monthly SBEs         40 to 64: Counseling/Anticipatory Guidance Discussed: screenings and self-breast exam    Follow Up  Return in about 1 year (around 7/30/2022) for Annual physical.  Patient was given instructions and counseling regarding her condition or for health maintenance advice. Please see specific information pulled into the AVS if appropriate.     Janelle Morales, CHARO  July 30, 2021  10:24 EDT

## 2021-07-30 NOTE — TELEPHONE ENCOUNTER
Ok, can you call her and let her know and see if she wants me to order one since it's due?    Thanks so much!

## 2021-08-02 ENCOUNTER — TELEPHONE (OUTPATIENT)
Dept: GASTROENTEROLOGY | Facility: CLINIC | Age: 71
End: 2021-08-02

## 2021-08-02 DIAGNOSIS — Z12.11 ENCOUNTER FOR SCREENING FOR MALIGNANT NEOPLASM OF COLON: Primary | ICD-10-CM

## 2021-08-02 DIAGNOSIS — Z01.818 PREOPERATIVE CLEARANCE: ICD-10-CM

## 2021-08-02 RX ORDER — MAGNESIUM CARB/ALUMINUM HYDROX 105-160MG
296 TABLET,CHEWABLE ORAL TAKE AS DIRECTED
Qty: 592 ML | Refills: 0 | Status: SHIPPED | OUTPATIENT
Start: 2021-08-02 | End: 2021-10-19 | Stop reason: HOSPADM

## 2021-08-02 RX ORDER — BISACODYL 5 MG/1
20 TABLET, DELAYED RELEASE ORAL ONCE
Qty: 4 TABLET | Refills: 0 | Status: SHIPPED | OUTPATIENT
Start: 2021-08-02 | End: 2021-08-02

## 2021-08-02 NOTE — TELEPHONE ENCOUNTER
Can you please put a case request in for patient to have a screening colonoscopy and send mag and dulcolax tab. She is scheduled for 8-31-21 with dr. Rhodes.

## 2021-08-16 ENCOUNTER — HOSPITAL ENCOUNTER (OUTPATIENT)
Facility: HOSPITAL | Age: 71
Setting detail: HOSPITAL OUTPATIENT SURGERY
End: 2021-08-16
Attending: INTERNAL MEDICINE | Admitting: INTERNAL MEDICINE

## 2021-08-16 PROBLEM — Z12.11 ENCOUNTER FOR SCREENING FOR MALIGNANT NEOPLASM OF COLON: Status: ACTIVE | Noted: 2021-08-16

## 2021-09-03 ENCOUNTER — TELEPHONE (OUTPATIENT)
Dept: OBSTETRICS AND GYNECOLOGY | Facility: CLINIC | Age: 71
End: 2021-09-03

## 2021-09-03 NOTE — TELEPHONE ENCOUNTER
PT received samples of Mybetriq 25 mg at her last visit.  She feels it is working well and she would like a prescription sent in for it.  Since it will be a mainentance medicine she will need it [facility].

## 2021-09-28 ENCOUNTER — TELEPHONE (OUTPATIENT)
Dept: OBSTETRICS AND GYNECOLOGY | Facility: CLINIC | Age: 71
End: 2021-09-28

## 2021-09-28 RX ORDER — OXYBUTYNIN CHLORIDE 5 MG/1
5 TABLET, EXTENDED RELEASE ORAL DAILY
Qty: 30 TABLET | Refills: 11 | Status: ON HOLD | OUTPATIENT
Start: 2021-09-28 | End: 2021-10-19

## 2021-09-28 NOTE — TELEPHONE ENCOUNTER
Cora,  Please call and let her know that her insurance will not cover the myrbetriq.  So, I know she was taking oxybutynin 10mg but felt like she had to strain to empty her bladder, so let's try the 5mg and see if that works better for her.  If she still has issue with it, just let me know and we will try something else.  I have sent a new script in for her.  She can finish the myrbetriq samples if she wants, then start the oxybutynin or if she wants to go ahead and stop it and start the oxybutynin that's fine too, she just doesn't need to take them both.      Thanks!

## 2021-10-07 DIAGNOSIS — Z12.11 ENCOUNTER FOR SCREENING FOR MALIGNANT NEOPLASM OF COLON: ICD-10-CM

## 2021-10-07 DIAGNOSIS — Z01.818 PREOPERATIVE CLEARANCE: Primary | ICD-10-CM

## 2021-10-15 ENCOUNTER — LAB (OUTPATIENT)
Dept: LAB | Facility: HOSPITAL | Age: 71
End: 2021-10-15

## 2021-10-15 DIAGNOSIS — Z01.818 PREOPERATIVE CLEARANCE: ICD-10-CM

## 2021-10-15 DIAGNOSIS — Z12.11 ENCOUNTER FOR SCREENING FOR MALIGNANT NEOPLASM OF COLON: ICD-10-CM

## 2021-10-15 PROCEDURE — U0005 INFEC AGEN DETEC AMPLI PROBE: HCPCS

## 2021-10-15 PROCEDURE — C9803 HOPD COVID-19 SPEC COLLECT: HCPCS

## 2021-10-15 PROCEDURE — U0004 COV-19 TEST NON-CDC HGH THRU: HCPCS

## 2021-10-16 LAB — SARS-COV-2 RNA NOSE QL NAA+PROBE: NOT DETECTED

## 2021-10-19 ENCOUNTER — ANESTHESIA EVENT (OUTPATIENT)
Dept: PERIOP | Facility: HOSPITAL | Age: 71
End: 2021-10-19

## 2021-10-19 ENCOUNTER — HOSPITAL ENCOUNTER (OUTPATIENT)
Facility: HOSPITAL | Age: 71
Setting detail: HOSPITAL OUTPATIENT SURGERY
Discharge: HOME | End: 2021-10-19
Attending: INTERNAL MEDICINE | Admitting: INTERNAL MEDICINE

## 2021-10-19 ENCOUNTER — ANESTHESIA (OUTPATIENT)
Dept: PERIOP | Facility: HOSPITAL | Age: 71
End: 2021-10-19

## 2021-10-19 VITALS
RESPIRATION RATE: 16 BRPM | TEMPERATURE: 98 F | WEIGHT: 158 LBS | HEART RATE: 74 BPM | BODY MASS INDEX: 31.85 KG/M2 | DIASTOLIC BLOOD PRESSURE: 60 MMHG | HEIGHT: 59 IN | SYSTOLIC BLOOD PRESSURE: 106 MMHG | OXYGEN SATURATION: 96 %

## 2021-10-19 DIAGNOSIS — Z01.818 PREOPERATIVE CLEARANCE: ICD-10-CM

## 2021-10-19 DIAGNOSIS — Z12.11 ENCOUNTER FOR SCREENING FOR MALIGNANT NEOPLASM OF COLON: ICD-10-CM

## 2021-10-19 PROCEDURE — 88305 TISSUE EXAM BY PATHOLOGIST: CPT | Performed by: INTERNAL MEDICINE

## 2021-10-19 PROCEDURE — 45385 COLONOSCOPY W/LESION REMOVAL: CPT | Performed by: INTERNAL MEDICINE

## 2021-10-19 PROCEDURE — 25010000002 PROPOFOL 10 MG/ML EMULSION: Performed by: NURSE ANESTHETIST, CERTIFIED REGISTERED

## 2021-10-19 RX ORDER — KETOROLAC TROMETHAMINE 30 MG/ML
15 INJECTION, SOLUTION INTRAMUSCULAR; INTRAVENOUS EVERY 6 HOURS PRN
Status: DISCONTINUED | OUTPATIENT
Start: 2021-10-19 | End: 2021-10-19 | Stop reason: HOSPADM

## 2021-10-19 RX ORDER — IPRATROPIUM BROMIDE AND ALBUTEROL SULFATE 2.5; .5 MG/3ML; MG/3ML
3 SOLUTION RESPIRATORY (INHALATION) ONCE AS NEEDED
Status: DISCONTINUED | OUTPATIENT
Start: 2021-10-19 | End: 2021-10-19 | Stop reason: HOSPADM

## 2021-10-19 RX ORDER — DROPERIDOL 2.5 MG/ML
0.62 INJECTION, SOLUTION INTRAMUSCULAR; INTRAVENOUS ONCE AS NEEDED
Status: DISCONTINUED | OUTPATIENT
Start: 2021-10-19 | End: 2021-10-19 | Stop reason: HOSPADM

## 2021-10-19 RX ORDER — SODIUM CHLORIDE, SODIUM LACTATE, POTASSIUM CHLORIDE, CALCIUM CHLORIDE 600; 310; 30; 20 MG/100ML; MG/100ML; MG/100ML; MG/100ML
125 INJECTION, SOLUTION INTRAVENOUS ONCE
Status: COMPLETED | OUTPATIENT
Start: 2021-10-19 | End: 2021-10-19

## 2021-10-19 RX ORDER — FENTANYL CITRATE 50 UG/ML
50 INJECTION, SOLUTION INTRAMUSCULAR; INTRAVENOUS
Status: DISCONTINUED | OUTPATIENT
Start: 2021-10-19 | End: 2021-10-19 | Stop reason: HOSPADM

## 2021-10-19 RX ORDER — SODIUM CHLORIDE 0.9 % (FLUSH) 0.9 %
10 SYRINGE (ML) INJECTION AS NEEDED
Status: DISCONTINUED | OUTPATIENT
Start: 2021-10-19 | End: 2021-10-19 | Stop reason: HOSPADM

## 2021-10-19 RX ORDER — OXYCODONE HYDROCHLORIDE AND ACETAMINOPHEN 5; 325 MG/1; MG/1
1 TABLET ORAL ONCE AS NEEDED
Status: DISCONTINUED | OUTPATIENT
Start: 2021-10-19 | End: 2021-10-19 | Stop reason: HOSPADM

## 2021-10-19 RX ORDER — MEPERIDINE HYDROCHLORIDE 25 MG/ML
12.5 INJECTION INTRAMUSCULAR; INTRAVENOUS; SUBCUTANEOUS
Status: DISCONTINUED | OUTPATIENT
Start: 2021-10-19 | End: 2021-10-19 | Stop reason: HOSPADM

## 2021-10-19 RX ORDER — MIDAZOLAM HYDROCHLORIDE 1 MG/ML
0.5 INJECTION INTRAMUSCULAR; INTRAVENOUS
Status: DISCONTINUED | OUTPATIENT
Start: 2021-10-19 | End: 2021-10-19 | Stop reason: HOSPADM

## 2021-10-19 RX ORDER — SODIUM CHLORIDE 0.9 % (FLUSH) 0.9 %
10 SYRINGE (ML) INJECTION EVERY 12 HOURS SCHEDULED
Status: DISCONTINUED | OUTPATIENT
Start: 2021-10-19 | End: 2021-10-19 | Stop reason: HOSPADM

## 2021-10-19 RX ORDER — SODIUM CHLORIDE, SODIUM LACTATE, POTASSIUM CHLORIDE, CALCIUM CHLORIDE 600; 310; 30; 20 MG/100ML; MG/100ML; MG/100ML; MG/100ML
100 INJECTION, SOLUTION INTRAVENOUS ONCE AS NEEDED
Status: DISCONTINUED | OUTPATIENT
Start: 2021-10-19 | End: 2021-10-19 | Stop reason: HOSPADM

## 2021-10-19 RX ORDER — LIDOCAINE HYDROCHLORIDE 20 MG/ML
INJECTION, SOLUTION INFILTRATION; PERINEURAL AS NEEDED
Status: DISCONTINUED | OUTPATIENT
Start: 2021-10-19 | End: 2021-10-19 | Stop reason: SURG

## 2021-10-19 RX ORDER — ONDANSETRON 2 MG/ML
4 INJECTION INTRAMUSCULAR; INTRAVENOUS AS NEEDED
Status: DISCONTINUED | OUTPATIENT
Start: 2021-10-19 | End: 2021-10-19 | Stop reason: HOSPADM

## 2021-10-19 RX ORDER — PROPOFOL 10 MG/ML
VIAL (ML) INTRAVENOUS AS NEEDED
Status: DISCONTINUED | OUTPATIENT
Start: 2021-10-19 | End: 2021-10-19 | Stop reason: SURG

## 2021-10-19 RX ADMIN — LIDOCAINE HYDROCHLORIDE 60 MG: 20 INJECTION, SOLUTION INFILTRATION; PERINEURAL at 10:43

## 2021-10-19 RX ADMIN — SODIUM CHLORIDE, POTASSIUM CHLORIDE, SODIUM LACTATE AND CALCIUM CHLORIDE: 600; 310; 30; 20 INJECTION, SOLUTION INTRAVENOUS at 10:43

## 2021-10-19 RX ADMIN — PROPOFOL 30 MG: 10 INJECTION, EMULSION INTRAVENOUS at 10:43

## 2021-10-19 RX ADMIN — PROPOFOL 150 MCG/KG/MIN: 10 INJECTION, EMULSION INTRAVENOUS at 10:43

## 2021-10-19 NOTE — ANESTHESIA POSTPROCEDURE EVALUATION
Patient: Kayleen Nassar    Procedure Summary     Date: 10/19/21 Room / Location:  [facility] OR 07 /  [facility]    Anesthesia Start: 1040 Anesthesia Stop: 1059    Procedure: COLONOSCOPY FOR SCREENING POLYPECTOMY (N/A ) Diagnosis:       Encounter for screening for malignant neoplasm of colon      Preoperative clearance      (Encounter for screening for malignant neoplasm of colon [Z12.11])      (Preoperative clearance [Z01.818])    Surgeons: Cora Causey MD Provider: John Holland MD    Anesthesia Type: general ASA Status: 3          Anesthesia Type: general    Vitals  No vitals data found for the desired time range.          Post Anesthesia Care and Evaluation    Patient location during evaluation: PACU  Patient participation: complete - patient participated  Level of consciousness: awake  Pain score: 0  Pain management: adequate  Airway patency: patent  Anesthetic complications: No anesthetic complications  PONV Status: none  Cardiovascular status: acceptable  Respiratory status: acceptable  Hydration status: acceptable

## 2021-10-19 NOTE — OP NOTE
COLONOSCOPY PROCEDURE NOTE    Kayleen Nassar  10/19/2021    GASTROENTEROLOGIST:  Cora Causey MD      PRE-PROCEDURE DIAGNOSIS:   Encounter for screening for malignant neoplasm of colon [Z12.11]  Preoperative clearance [Z01.818]    POST-PROCEDURE DIAGNOSIS:  1.  Colon polyps  2.  Internal hemorrhoids    PROCEDURE:  COLONOSCOPY with snare polypectomy    ANESTHESIA:  Propofol administered by anesthesia.  See anesthesia notes for ASA classification    STAFF  Circulator: Wolfgang Garber, ADRIANA  Endo Technician: Praveen Hernandez Alexus    Findings:  1.  2 polyps were removed from the transverse colon with cold snare polypectomy.  The polyps were diminutive in size.  2.  Small internal hemorrhoids.  3.  Normal-appearing terminal ileum.    OPERATIVE PROCEDURE   After proper informed consent was obtained, the patient was taken the operating suite and placed in left lateral decubitus position.  An Olympus video colonoscope 180 series was inserted in the rectum and advanced to the terminal ileum under direct visualization.  Cecum and terminal ileum were identified by visualization of the appendiceal orifice and ileocecal valve.  The colonoscope was then slowly withdrawn from the cecum to the rectum and passed a second time from rectum to cecum.  The colonoscope was retroflexed in the cecum and rectum. Scope was then withdrawn. Patient tolerated the procedure well. There were no immediate complications. Cecal withdrawal time was 10 minutes.    ESTIMATED BLOOD LOSS  None     COMPLICATIONS  None    RECOMMENDATIONS:  1.  Follow-up pathology.  2.  Repeat colonoscopy in 3 years due to personal history of colon polyps.    Cora Causey MD  10/19/21 10:57 EDT

## 2021-10-19 NOTE — ANESTHESIA PREPROCEDURE EVALUATION
Anesthesia Evaluation     Patient summary reviewed and Nursing notes reviewed   history of anesthetic complications (Family history of Pseudocholinesterase deficency):  NPO Solid Status: > 8 hours  NPO Liquid Status: > 8 hours           Airway   Mallampati: III  TM distance: >3 FB  Neck ROM: full  Small opening  Dental - normal exam     Pulmonary - normal exam   (+) sleep apnea,   Cardiovascular - normal exam    ECG reviewed    (+) hypertension, hyperlipidemia,     ROS comment: 11/7/18 EKG  Normal sinus rhythm  Normal ECG  No previous ECGs available  Confirmed by MALCOLM GARCIA MD ([id number]) on 11/8/2018 8:55:26 AM       Neuro/Psych  (+) psychiatric history Depression,     GI/Hepatic/Renal/Endo    (+)   renal disease CRI,     Musculoskeletal (-) negative ROS    Abdominal  - normal exam   Substance History - negative use     OB/GYN negative ob/gyn ROS         Other      history of cancer remission                  Anesthesia Plan    ASA 3     general     intravenous induction     Anesthetic plan, all risks, benefits, and alternatives have been provided, discussed and informed consent has been obtained with: patient.  Use of blood products discussed with patient  Consented to blood products.       Lab Results   Component Value Date    WBC 6.53 10/22/2019    HGB 12.9 10/22/2019    HCT 40.0 10/22/2019    MCV 93.2 10/22/2019     10/22/2019       Lab Results   Component Value Date    GLUCOSE 101 (H) 10/22/2019    BUN 15 10/22/2019    CREATININE 0.98 10/22/2019    EGFRIFNONA 57 (L) 10/22/2019    BCR 15.3 10/22/2019    K 4.2 10/22/2019    CO2 22.2 10/22/2019    CALCIUM 9.2 10/22/2019    ALBUMIN 4.00 10/22/2019    AST 21 10/22/2019    ALT 13 10/22/2019

## 2021-10-19 NOTE — H&P
Chief complaint  Colorectal cancer screening    Subjective     Patient is a 71 y.o. female who presents today for a screening colonoscopy.   She denies all GI symptoms, including BRBPR.  She also denies weight loss.  Her last colonoscopy was around 2014 when two polyps were removed.  She states that she does not think they were precancerous.  Family history is positive for her maternal grandfather and first cousin having cousin cancer.  Medical, surgical, and social histories were reviewed and are listed below.      Review of Systems  Review of Systems - General ROS: negative for - weight loss  Psychological ROS: negative for - behavioral disorder  Ophthalmic ROS: negative for - dry eyes  ENT ROS: negative for - vertigo or vocal changes  Hematological and Lymphatic ROS: negative for - jaundice or swollen lymph nodes  Respiratory ROS: negative for - sputum changes or stridor  Cardiovascular ROS: negative for - irregular heartbeat or murmur  Gastrointestinal ROS: negative for - blood in stools or change in stools  Genito-Urinary ROS: negative for - hematuria or incontinence  Musculoskeletal ROS: negative for - gait disturbance      History  Past Medical History:   Diagnosis Date   • Adenomatous polyp of colon/2006 and 2009 4/17/2018   • Chronic kidney disease    • History of breast cancer 12/16/2018   • Hx of abdominal hysterectomy, BSO 2001 4/3/2018   • Hx of radiation therapy    • Hyperlipidemia    • Hypertension    • Malignant neoplasm of upper-inner quadrant of left breast in female, estrogen receptor positive (HCC) 1/28/2019   • Osteopenia determined by x-ray-2003 2005 2009 4/27/2018   • Sleep apnea      Past Surgical History:   Procedure Laterality Date   • BACK SURGERY  2001 1991   • BREAST BIOPSY     • BREAST LUMPECTOMY Left 11/2018   • BREAST LUMPECTOMY Left    • TOTAL ABDOMINAL HYSTERECTOMY WITH SALPINGO OOPHORECTOMY Bilateral 2001     Family History   Problem Relation Age of Onset   • Breast cancer  Sister 50   • Ovarian cancer Sister 52   • Osteoporosis Mother    • Colon cancer Neg Hx    • Endometrial cancer Neg Hx      Social History     Tobacco Use   • Smoking status: Never Smoker   • Smokeless tobacco: Never Used   Vaping Use   • Vaping Use: Never used   Substance Use Topics   • Alcohol use: No   • Drug use: No     Medications Prior to Admission   Medication Sig Dispense Refill Last Dose   • bisoprolol-hydrochlorothiazide (ZIAC) 5-6.25 MG per tablet    10/18/2021 at Unknown time   • lisinopril (PRINIVIL,ZESTRIL) 10 MG tablet    10/18/2021 at Unknown time   • magnesium citrate 1.745 GM/30ML solution solution Take 296 mL by mouth Take As Directed for 2 doses. Take one full bottle at 4:00 PM and take second bottle at 10:00 PM. 592 mL 0 10/18/2021 at Unknown time   • pantoprazole (PROTONIX) 40 MG EC tablet    10/18/2021 at Unknown time   • tamoxifen (NOLVADEX) 20 MG chemo tablet Take 1 tablet by mouth Daily. 90 tablet 2 10/18/2021 at Unknown time     Allergies:  Patient has no known allergies.    Objective     Vital Signs  Temp:  [97.6 °F (36.4 °C)] 97.6 °F (36.4 °C)  Heart Rate:  [84] 84  Resp:  [16] 16  BP: (125)/(67) 125/67    Physical Exam  General:  This is a WD pleasant female in no acute distress  Vital signs stable, afebrile  HEENT exam:  WNL. Sclera are anicteric.  EOMI  Neck:  supple, FROM.  No JVD.  Trachea midline  Lungs:  Respiratory effort normal. Auscultation: Clear, without wheezes, rhonchi, rales  Heart:  Regular rate and rhythm, without murmur, gallop, rub.  No pedal edema  Abdomen:  No tenderness or palpable masses;  Bowel sounds normal  Musculoskeletal:  muscle strength/tone is normal.    Psyc:  alert, oriented x 3.  Mood and affect are appropriate  skin:  Warm with good turgor.  Without rash or lesion  extremities:  Examination of the extremities revealed no cyanosis, clubbing or edema.    Results Review:                     Invalid input(s): PROTCrCl cannot be calculated (Patient's most  recent lab result is older than the maximum 30 days allowed.).  No results found for: AMMONIA      No results found for: BLOODCX  No results found for: URINECX  No results found for: WOUNDCX  No results found for: STOOLCX    Imaging:  Imaging Results (Last 24 Hours)     ** No results found for the last 24 hours. **                Impression:  Patient Active Problem List   Diagnosis Code   • History of back surgery ,  Z98.890   • Family history of breast cancer in sister Z80.3   • Family history of ovarian cancer-same sister has breast cancer and   Z80.41   • Osteopenia of multiple sites DEXA (3709-1973) M85.89   • Malignant neoplasm of upper-inner quadrant of left breast in female, estrogen receptor positive (HCC) C50.212, Z17.0   • STAS (stress urinary incontinence, female) N39.3   • Moderate episode of recurrent major depressive disorder (HCC) F33.1   • Encounter for screening for malignant neoplasm of colon Z12.11   • Preoperative clearance Z01.818       Assessment:  1.  Colorectal cancer screening  2   Family history colon cancer (maternal grandfather and first cousin)    Plan:  The patient will undergo a screening colonoscopy.  She was educated on procedure and voiced understanding and agreement.    RONALD Rudd  10/19/21  08:31 EDT

## 2021-10-22 LAB — LAB AP CASE REPORT: NORMAL

## 2021-10-26 NOTE — PROGRESS NOTES
The polyps removed from your colon were precancerous. We will repeat your colonoscopy in 3 years due to personal history of colon polyps.

## 2021-12-15 DIAGNOSIS — Z17.0 MALIGNANT NEOPLASM OF UPPER-INNER QUADRANT OF LEFT BREAST IN FEMALE, ESTROGEN RECEPTOR POSITIVE (HCC): Primary | ICD-10-CM

## 2021-12-15 DIAGNOSIS — C50.212 MALIGNANT NEOPLASM OF UPPER-INNER QUADRANT OF LEFT BREAST IN FEMALE, ESTROGEN RECEPTOR POSITIVE (HCC): Primary | ICD-10-CM

## 2021-12-15 RX ORDER — TAMOXIFEN CITRATE 20 MG/1
TABLET ORAL
Qty: 90 TABLET | Refills: 3 | Status: SHIPPED | OUTPATIENT
Start: 2021-12-15 | End: 2022-12-12

## 2022-02-21 ENCOUNTER — TRANSCRIBE ORDERS (OUTPATIENT)
Dept: ADMINISTRATIVE | Facility: HOSPITAL | Age: 72
End: 2022-02-21

## 2022-02-21 DIAGNOSIS — R92.8 ABNORMAL MAMMOGRAM: Primary | ICD-10-CM

## 2022-07-29 ENCOUNTER — DOCUMENTATION (OUTPATIENT)
Dept: CARDIOLOGY | Facility: CLINIC | Age: 72
End: 2022-07-29

## 2022-08-01 ENCOUNTER — OFFICE VISIT (OUTPATIENT)
Dept: CARDIOLOGY | Facility: CLINIC | Age: 72
End: 2022-08-01

## 2022-08-01 VITALS
WEIGHT: 161 LBS | HEIGHT: 60 IN | OXYGEN SATURATION: 96 % | DIASTOLIC BLOOD PRESSURE: 70 MMHG | SYSTOLIC BLOOD PRESSURE: 118 MMHG | BODY MASS INDEX: 31.61 KG/M2 | HEART RATE: 82 BPM

## 2022-08-01 DIAGNOSIS — R07.2 CHEST PAIN, PRECORDIAL: Primary | ICD-10-CM

## 2022-08-01 DIAGNOSIS — R94.31 ABNORMAL EKG: ICD-10-CM

## 2022-08-01 DIAGNOSIS — R06.09 DOE (DYSPNEA ON EXERTION): ICD-10-CM

## 2022-08-01 PROCEDURE — 99204 OFFICE O/P NEW MOD 45 MIN: CPT | Performed by: INTERNAL MEDICINE

## 2022-08-01 PROCEDURE — 93000 ELECTROCARDIOGRAM COMPLETE: CPT | Performed by: INTERNAL MEDICINE

## 2022-08-01 RX ORDER — CETIRIZINE HYDROCHLORIDE 10 MG/1
10 TABLET ORAL AS NEEDED
COMMUNITY
End: 2022-09-06

## 2022-08-01 RX ORDER — ASPIRIN 81 MG/1
81 TABLET ORAL DAILY
COMMUNITY

## 2022-08-01 RX ORDER — PRAVASTATIN SODIUM 20 MG
20 TABLET ORAL DAILY
COMMUNITY

## 2022-08-01 RX ORDER — OXYBUTYNIN CHLORIDE 5 MG/1
5 TABLET, EXTENDED RELEASE ORAL DAILY
COMMUNITY
End: 2022-09-06 | Stop reason: SDUPTHER

## 2022-08-01 NOTE — PROGRESS NOTES
Subjective:     Encounter Date:08/01/2022    Primary Care Physician: Theo Anguiano MD      Patient ID: Kayleen Nassar is a 71 y.o. female.    Chief Complaint:Chest Pain, Dizziness, Shortness of Breath, Edema, and Irregular Heart Beat    PROBLEM LIST:  1. Chest pain/dyspnea  2. Hypertension  3. Dyslipidemia  4. History of TIA symptoms  5. Neurocardiogenic syncope  a. Reportedly diagnosed 2005  6. Sleep apnea, no CPAP  7. History of breast cancer, left 2018  a. XRT x 15, no chemo.  8. Osteopenia  9. History of colon polyps  10. GERD  11. Surgeries:  a. Back surgery  b. Breast biopsy and lumpectomy  c. Total hysterectomy       No Known Allergies      Current Outpatient Medications:   •  aspirin 81 MG EC tablet, Take 81 mg by mouth Daily., Disp: , Rfl:   •  bisoprolol-hydrochlorothiazide (ZIAC) 5-6.25 MG per tablet, , Disp: , Rfl:   •  cetirizine (zyrTEC) 10 MG tablet, Take 10 mg by mouth As Needed., Disp: , Rfl:   •  lisinopril (PRINIVIL,ZESTRIL) 10 MG tablet, , Disp: , Rfl:   •  oxybutynin XL (DITROPAN-XL) 5 MG 24 hr tablet, Take 5 mg by mouth Daily., Disp: , Rfl:   •  pantoprazole (PROTONIX) 40 MG EC tablet, , Disp: , Rfl:   •  pravastatin (PRAVACHOL) 20 MG tablet, Take 20 mg by mouth Daily., Disp: , Rfl:   •  tamoxifen (NOLVADEX) 20 MG chemo tablet, TAKE 1 TABLET DAILY, Disp: 90 tablet, Rfl: 3        History of Present Illness    Patient is a 71-year-old  female who presents today for further evaluation of chest pain and shortness of breath on exertion.  She has no previous history of coronary disease.  She does have a history of neurocardiogenic syncope that was reportedly diagnosed in 2005.  Most recent syncopal episode was April 30.  Patient notes over the last several months worsening shortness of breath with physical activity.  Notes that she is unable to perform her usual walk due to increased shortness of breath she will also experience discomfort in between her shoulder blades and at that it  feels that is her chest is caving in on itself.  This is typically alleviated with rest.  Had some recent lower extremity edema which is now resolved.  Has also had some recurrent TIA symptoms most recently within the last month.  Since that time she has had some persistent left-sided heaviness.  Due to her symptoms she was referred here for further evaluation.      The following portions of the patient's history were reviewed and updated as appropriate: allergies, current medications, past family history, past medical history, past social history, past surgical history and problem list.    Family History   Problem Relation Age of Onset   • Osteoporosis Mother    • Breast cancer Sister 50   • Ovarian cancer Sister 52   • Colon cancer Neg Hx    • Endometrial cancer Neg Hx        Social History     Tobacco Use   • Smoking status: Never Smoker   • Smokeless tobacco: Never Used   Vaping Use   • Vaping Use: Never used   Substance Use Topics   • Alcohol use: No   • Drug use: No         Review of Systems   Constitutional: Positive for malaise/fatigue. Negative for fever.   HENT: Negative for nosebleeds.    Eyes: Positive for blurred vision. Negative for redness and visual disturbance.   Cardiovascular: Positive for chest pain, leg swelling and palpitations. Negative for orthopnea and paroxysmal nocturnal dyspnea.   Respiratory: Positive for shortness of breath and snoring. Negative for cough, sputum production and wheezing.    Hematologic/Lymphatic: Negative for bleeding problem.   Skin: Negative for flushing, itching and rash.   Musculoskeletal: Negative for falls, joint pain and muscle cramps.   Gastrointestinal: Positive for heartburn. Negative for abdominal pain, diarrhea, nausea and vomiting.   Genitourinary: Negative for hematuria.   Neurological: Positive for excessive daytime sleepiness, dizziness and headaches. Negative for tremors and weakness.   Psychiatric/Behavioral: Positive for depression. Negative for  "substance abuse. The patient is not nervous/anxious.           Objective:   /70 (BP Location: Right arm, Patient Position: Sitting)   Pulse 82   Ht 152.4 cm (60\")   Wt 73 kg (161 lb)   SpO2 96%   BMI 31.44 kg/m²         Vitals reviewed.   Constitutional:       Appearance: Healthy appearance. Well-developed and not in distress.   Eyes:      Conjunctiva/sclera: Conjunctivae normal.      Pupils: Pupils are equal, round, and reactive to light.   HENT:      Head: Normocephalic and atraumatic.    Mouth/Throat:      Pharynx: Oropharynx is clear.   Neck:      Thyroid: Thyroid normal. No thyromegaly.      Vascular: Normal carotid pulses. No carotid bruit or JVD. JVD normal.      Lymphadenopathy: No cervical adenopathy.   Pulmonary:      Effort: No respiratory distress.      Breath sounds: No wheezing. No rales.   Chest:      Chest wall: Not tender to palpatation.   Cardiovascular:      Normal rate. Regular rhythm.      No gallop.   Pulses:     Carotid: 2+ bilaterally.     Dorsalis pedis: 2+ bilaterally.     Posterior tibial: 2+ bilaterally.  Abdominal:      General: There is no distension or abdominal bruit.      Palpations: There is no abdominal mass.      Tenderness: There is no abdominal tenderness. There is no rebound.   Musculoskeletal:         General: No tenderness or deformity.      Extremities: No clubbing present.Skin:     General: Skin is warm and dry. There is no cyanosis.      Findings: No rash.   Neurological:      Mental Status: Alert, oriented to person, place, and time and oriented to person, place and time.           ECG 12 Lead    Date/Time: 8/1/2022 1:01 PM  Performed by: Kiet Lombardo MD  Authorized by: Kiet Lombardo MD   Comparison: compared with previous ECG from 11/8/2018  Comparison to previous ECG: Low voltage no noted  Rhythm: sinus rhythm  Other findings: low voltage    Clinical impression: non-specific ECG                  Assessment:   Assessment & Plan      Diagnoses and all " orders for this visit:    1. Chest pain, precordial (Primary)  -     ECG 12 Lead  -     Stress Test With Myocardial Perfusion One Day; Future  -     Adult Transthoracic Echo Complete W/ Cont if Necessary Per Protocol; Future    2. Abnormal EKG  -     Stress Test With Myocardial Perfusion One Day; Future  -     Adult Transthoracic Echo Complete W/ Cont if Necessary Per Protocol; Future    3. TOMAS (dyspnea on exertion)  -     Stress Test With Myocardial Perfusion One Day; Future  -     Adult Transthoracic Echo Complete W/ Cont if Necessary Per Protocol; Future      1.  Chest pain dyspnea exertion, consistent with new onset functional class III angina.  2.  Dyslipidemia on low intensity statin  3.  Positive family history coronary disease  4.  Abnormal EKG  5.  Hypertension well-controlled on lisinopril and beta-blocker    Recommendations:  1.  We will check echocardiogram  2.  Check exercise myocardial perfusion study  3.  Check fasting lipid profile  4.  Further recommendations after the above.    MD Marivel Cuadra scribed portions of this dictation for Dr. Kiet Lombardo.     Dictated utilizing Dragon dictation

## 2022-08-11 ENCOUNTER — HOSPITAL ENCOUNTER (OUTPATIENT)
Dept: CARDIOLOGY | Facility: HOSPITAL | Age: 72
Discharge: HOME | End: 2022-08-11

## 2022-08-11 VITALS — WEIGHT: 158.07 LBS | BODY MASS INDEX: 31.03 KG/M2 | HEIGHT: 60 IN

## 2022-08-11 VITALS
HEART RATE: 77 BPM | WEIGHT: 158 LBS | BODY MASS INDEX: 31.02 KG/M2 | SYSTOLIC BLOOD PRESSURE: 136 MMHG | OXYGEN SATURATION: 97 % | HEIGHT: 60 IN | DIASTOLIC BLOOD PRESSURE: 76 MMHG

## 2022-08-11 DIAGNOSIS — R06.09 DOE (DYSPNEA ON EXERTION): ICD-10-CM

## 2022-08-11 DIAGNOSIS — R07.2 CHEST PAIN, PRECORDIAL: ICD-10-CM

## 2022-08-11 DIAGNOSIS — R94.31 ABNORMAL EKG: ICD-10-CM

## 2022-08-11 PROCEDURE — 93306 TTE W/DOPPLER COMPLETE: CPT | Performed by: INTERNAL MEDICINE

## 2022-08-11 PROCEDURE — 93306 TTE W/DOPPLER COMPLETE: CPT

## 2022-08-11 PROCEDURE — A9500 TC99M SESTAMIBI: HCPCS | Performed by: INTERNAL MEDICINE

## 2022-08-11 PROCEDURE — 78452 HT MUSCLE IMAGE SPECT MULT: CPT

## 2022-08-11 PROCEDURE — 25010000002 SULFUR HEXAFLUORIDE MICROSPH 60.7-25 MG RECONSTITUTED SUSPENSION: Performed by: INTERNAL MEDICINE

## 2022-08-11 PROCEDURE — 25010000002 REGADENOSON 0.4 MG/5ML SOLUTION: Performed by: INTERNAL MEDICINE

## 2022-08-11 PROCEDURE — 93018 CV STRESS TEST I&R ONLY: CPT | Performed by: INTERNAL MEDICINE

## 2022-08-11 PROCEDURE — 78452 HT MUSCLE IMAGE SPECT MULT: CPT | Performed by: INTERNAL MEDICINE

## 2022-08-11 PROCEDURE — 93017 CV STRESS TEST TRACING ONLY: CPT

## 2022-08-11 PROCEDURE — 0 TECHNETIUM SESTAMIBI: Performed by: INTERNAL MEDICINE

## 2022-08-11 RX ADMIN — REGADENOSON 0.4 MG: 0.08 INJECTION, SOLUTION INTRAVENOUS at 14:13

## 2022-08-11 RX ADMIN — SULFUR HEXAFLUORIDE 2 ML: KIT at 15:50

## 2022-08-11 RX ADMIN — TECHNETIUM TC 99M SESTAMIBI 1 DOSE: 1 INJECTION INTRAVENOUS at 14:15

## 2022-08-11 RX ADMIN — TECHNETIUM TC 99M SESTAMIBI 1 DOSE: 1 INJECTION INTRAVENOUS at 12:45

## 2022-08-12 LAB
ASCENDING AORTA: 3.1 CM
BH CV ECHO MEAS - AO MAX PG: 7.4 MMHG
BH CV ECHO MEAS - AO MEAN PG: 4 MMHG
BH CV ECHO MEAS - AO ROOT DIAM: 3.3 CM
BH CV ECHO MEAS - AO V2 MAX: 136 CM/SEC
BH CV ECHO MEAS - AO V2 VTI: 28 CM
BH CV ECHO MEAS - AVA(I,D): 1.96 CM2
BH CV ECHO MEAS - EDV(CUBED): 79.5 ML
BH CV ECHO MEAS - EDV(MOD-SP2): 37.8 ML
BH CV ECHO MEAS - EDV(MOD-SP4): 34.3 ML
BH CV ECHO MEAS - EF(MOD-BP): 67.1 %
BH CV ECHO MEAS - EF(MOD-SP2): 70.1 %
BH CV ECHO MEAS - EF(MOD-SP4): 64.7 %
BH CV ECHO MEAS - ESV(CUBED): 17.6 ML
BH CV ECHO MEAS - ESV(MOD-SP2): 11.3 ML
BH CV ECHO MEAS - ESV(MOD-SP4): 12.1 ML
BH CV ECHO MEAS - FS: 39.5 %
BH CV ECHO MEAS - IVS/LVPW: 0.9 CM
BH CV ECHO MEAS - IVSD: 0.9 CM
BH CV ECHO MEAS - LA DIMENSION: 3.1 CM
BH CV ECHO MEAS - LAT PEAK E' VEL: 8.9 CM/SEC
BH CV ECHO MEAS - LV DIASTOLIC VOL/BSA (35-75): 20.4 CM2
BH CV ECHO MEAS - LV MASS(C)D: 132.7 GRAMS
BH CV ECHO MEAS - LV MAX PG: 4.9 MMHG
BH CV ECHO MEAS - LV MEAN PG: 2 MMHG
BH CV ECHO MEAS - LV SYSTOLIC VOL/BSA (12-30): 7.2 CM2
BH CV ECHO MEAS - LV V1 MAX: 111 CM/SEC
BH CV ECHO MEAS - LV V1 VTI: 21.6 CM
BH CV ECHO MEAS - LVIDD: 4.3 CM
BH CV ECHO MEAS - LVIDS: 2.6 CM
BH CV ECHO MEAS - LVOT AREA: 2.5 CM2
BH CV ECHO MEAS - LVOT DIAM: 1.8 CM
BH CV ECHO MEAS - LVPWD: 1 CM
BH CV ECHO MEAS - MED PEAK E' VEL: 5.7 CM/SEC
BH CV ECHO MEAS - MV A MAX VEL: 92.4 CM/SEC
BH CV ECHO MEAS - MV DEC SLOPE: 346 CM/SEC2
BH CV ECHO MEAS - MV DEC TIME: 0.21 MSEC
BH CV ECHO MEAS - MV E MAX VEL: 71.8 CM/SEC
BH CV ECHO MEAS - MV E/A: 0.78
BH CV ECHO MEAS - MV MAX PG: 3.6 MMHG
BH CV ECHO MEAS - MV MEAN PG: 2 MMHG
BH CV ECHO MEAS - MV V2 VTI: 23.3 CM
BH CV ECHO MEAS - MVA(VTI): 2.36 CM2
BH CV ECHO MEAS - PA ACC TIME: 0.13 SEC
BH CV ECHO MEAS - PA PR(ACCEL): 22.8 MMHG
BH CV ECHO MEAS - PA V2 MAX: 97.6 CM/SEC
BH CV ECHO MEAS - SI(MOD-SP2): 15.7 ML/M2
BH CV ECHO MEAS - SI(MOD-SP4): 13.2 ML/M2
BH CV ECHO MEAS - SV(LVOT): 55 ML
BH CV ECHO MEAS - SV(MOD-SP2): 26.5 ML
BH CV ECHO MEAS - SV(MOD-SP4): 22.2 ML
BH CV ECHO MEAS - TAPSE (>1.6): 1.83 CM
BH CV ECHO MEASUREMENTS AVERAGE E/E' RATIO: 9.84
BH CV REST NUCLEAR ISOTOPE DOSE: 9.7 MCI
BH CV STRESS BP STAGE 2: NORMAL
BH CV STRESS BP STAGE 4: NORMAL
BH CV STRESS COMMENTS STAGE 1: NORMAL
BH CV STRESS DOSE REGADENOSON STAGE 1: 0.4
BH CV STRESS DURATION MIN STAGE 1: 1
BH CV STRESS DURATION MIN STAGE 2: 1
BH CV STRESS DURATION MIN STAGE 3: 1
BH CV STRESS DURATION MIN STAGE 4: 1
BH CV STRESS DURATION SEC STAGE 1: 0
BH CV STRESS DURATION SEC STAGE 2: 0
BH CV STRESS DURATION SEC STAGE 3: 0
BH CV STRESS DURATION SEC STAGE 4: 0
BH CV STRESS HR STAGE 1: 92
BH CV STRESS HR STAGE 2: 118
BH CV STRESS HR STAGE 3: 115
BH CV STRESS HR STAGE 4: 108
BH CV STRESS NUCLEAR ISOTOPE DOSE: 32.5 MCI
BH CV STRESS O2 STAGE 1: 98
BH CV STRESS O2 STAGE 2: 98
BH CV STRESS O2 STAGE 3: 98
BH CV STRESS O2 STAGE 4: 98
BH CV STRESS PROTOCOL 1: NORMAL
BH CV STRESS RECOVERY BP: NORMAL MMHG
BH CV STRESS RECOVERY HR: 97 BPM
BH CV STRESS RECOVERY O2: 97 %
BH CV STRESS STAGE 1: 1
BH CV STRESS STAGE 2: 2
BH CV STRESS STAGE 3: 3
BH CV STRESS STAGE 4: 4
BH CV VAS BP RIGHT ARM: NORMAL MMHG
BH CV XLRA - RV BASE: 2.7 CM
BH CV XLRA - RV LENGTH: 5.9 CM
BH CV XLRA - RV MID: 2.2 CM
BH CV XLRA - TDI S': 13.3 CM/SEC
IVRT: 92 MSEC
LEFT ATRIUM VOLUME INDEX: 16.5 ML/M2
LV EF 2D ECHO EST: 65 %
LV EF NUC BP: 85 %
MAXIMAL PREDICTED HEART RATE: 151 BPM
MAXIMAL PREDICTED HEART RATE: 151 BPM
PERCENT MAX PREDICTED HR: 79.47 %
STRESS BASELINE BP: NORMAL MMHG
STRESS BASELINE HR: 76 BPM
STRESS O2 SAT REST: 98 %
STRESS PERCENT HR: 93 %
STRESS POST ESTIMATED WORKLOAD: 1 METS
STRESS POST EXERCISE DUR MIN: 4 MIN
STRESS POST EXERCISE DUR SEC: 0 SEC
STRESS POST O2 SAT PEAK: 98 %
STRESS POST PEAK BP: NORMAL MMHG
STRESS POST PEAK HR: 120 BPM
STRESS TARGET HR: 128 BPM
STRESS TARGET HR: 128 BPM

## 2022-08-15 ENCOUNTER — TELEPHONE (OUTPATIENT)
Dept: CARDIOLOGY | Facility: CLINIC | Age: 72
End: 2022-08-15

## 2022-08-15 NOTE — TELEPHONE ENCOUNTER
Spoke with patient, advised of below  ----- Message from Kiet Lombardo MD sent at 8/12/2022  2:00 PM EDT -----  Normal echo

## 2022-08-15 NOTE — TELEPHONE ENCOUNTER
Spoke with patient, advised of below  ----- Message from Kiet Lombardo MD sent at 8/12/2022  5:27 PM EDT -----  Normal stress test letter

## 2022-08-29 ENCOUNTER — TELEPHONE (OUTPATIENT)
Dept: ONCOLOGY | Facility: CLINIC | Age: 72
End: 2022-08-29

## 2022-08-29 NOTE — TELEPHONE ENCOUNTER
Caller: Kayleen Nassar    Relationship to patient: Self    Best call back number: [phone]    Chief complaint: PATIENT CALLING TO SCHEDULE WITH OFFICE    Type of visit: FU    Requested date: AFTER 8/30/33

## 2022-08-30 ENCOUNTER — HOSPITAL ENCOUNTER (OUTPATIENT)
Dept: MAMMOGRAPHY | Facility: HOSPITAL | Age: 72
Discharge: HOME | End: 2022-08-30

## 2022-08-30 ENCOUNTER — HOSPITAL ENCOUNTER (OUTPATIENT)
Dept: ULTRASOUND IMAGING | Facility: HOSPITAL | Age: 72
Discharge: HOME | End: 2022-08-30

## 2022-08-30 DIAGNOSIS — R92.8 ABNORMAL MAMMOGRAM: ICD-10-CM

## 2022-08-30 PROCEDURE — 76642 ULTRASOUND BREAST LIMITED: CPT

## 2022-08-30 PROCEDURE — 77066 DX MAMMO INCL CAD BI: CPT | Performed by: RADIOLOGY

## 2022-08-30 PROCEDURE — 77066 DX MAMMO INCL CAD BI: CPT

## 2022-08-30 PROCEDURE — 76642 ULTRASOUND BREAST LIMITED: CPT | Performed by: RADIOLOGY

## 2022-08-30 PROCEDURE — G0279 TOMOSYNTHESIS, MAMMO: HCPCS

## 2022-08-30 PROCEDURE — G0279 TOMOSYNTHESIS, MAMMO: HCPCS | Performed by: RADIOLOGY

## 2022-09-06 ENCOUNTER — OFFICE VISIT (OUTPATIENT)
Dept: OBSTETRICS AND GYNECOLOGY | Facility: CLINIC | Age: 72
End: 2022-09-06

## 2022-09-06 VITALS
BODY MASS INDEX: 31.64 KG/M2 | RESPIRATION RATE: 16 BRPM | DIASTOLIC BLOOD PRESSURE: 80 MMHG | SYSTOLIC BLOOD PRESSURE: 126 MMHG | WEIGHT: 162 LBS

## 2022-09-06 DIAGNOSIS — Z80.3 FAMILY HISTORY OF BREAST CANCER: ICD-10-CM

## 2022-09-06 DIAGNOSIS — M85.80 OSTEOPENIA, SENILE: ICD-10-CM

## 2022-09-06 DIAGNOSIS — M85.88 OTHER SPECIFIED DISORDERS OF BONE DENSITY AND STRUCTURE, OTHER SITE: ICD-10-CM

## 2022-09-06 DIAGNOSIS — Z85.3 PERSONAL HISTORY OF BREAST CANCER: ICD-10-CM

## 2022-09-06 DIAGNOSIS — Z01.411 ENCOUNTER FOR GYNECOLOGICAL EXAMINATION WITH ABNORMAL FINDING: Primary | ICD-10-CM

## 2022-09-06 DIAGNOSIS — Z80.41 FAMILY HISTORY OF OVARIAN CANCER: ICD-10-CM

## 2022-09-06 DIAGNOSIS — N39.46 URINARY INCONTINENCE, MIXED: ICD-10-CM

## 2022-09-06 PROCEDURE — G0101 CA SCREEN;PELVIC/BREAST EXAM: HCPCS | Performed by: NURSE PRACTITIONER

## 2022-09-06 RX ORDER — NYSTATIN 100000 U/G
1 OINTMENT TOPICAL 2 TIMES DAILY
Qty: 30 G | Refills: 3 | Status: SHIPPED | OUTPATIENT
Start: 2022-09-06

## 2022-09-06 RX ORDER — OXYBUTYNIN CHLORIDE 5 MG/1
5 TABLET, EXTENDED RELEASE ORAL DAILY
Qty: 30 TABLET | Refills: 11 | Status: SHIPPED | OUTPATIENT
Start: 2022-09-06

## 2022-09-06 NOTE — PROGRESS NOTES
Annual Visit     Patient Name: Kayleen Nassar  : 1950   MRN: [id number]   Care Team: Patient Care Team:  Theo Anguiano MD as PCP - General (Internal Medicine)  Aida Zavaleta MD as Consulting Physician (Hematology and Oncology)  Jung Snyder MD (Inactive) as Referring Physician (General Surgery)  Izabella Tavarez MD as Consulting Physician (Radiation Oncology)  Theo Anguiano MD (Internal Medicine)  Janelle Morales APRN as Nurse Practitioner (Nurse Practitioner)    Chief Complaint:    Chief Complaint   Patient presents with   • Annual Exam       HPI: Kayleen Nassar is a 72 y.o. year old  presenting to be seen for her gynecologic exam.   S/p total hyst with BSO     Dx mammogram 2022 Birads 1 - may return to screening mammogram in 1 yr   Hx left breast cancer - s/p lumpectomy in    Taking Tamoxifen   She does monthly SBEs   Sister with hx of breast and ovarian cancer   States she has had genetic testing that was negative     Hx osteopenia - DEXA 2019 rpt 2-3 yrs   She takes daily vit d and eats calcium rich diet     Colonoscopy  rpt 5 yrs     C/o mixed urinary incontinence - daytime sx have improved significanlty with oxybutynin 5mg qd   But still awakens 3-4x at night to void   Has to wear a pad daily now but leakage is much less with medication   Trial of myrbetriq but insurance would not cover   Started with oxybutynin 10mg But felt she has to strain to completely empty her bladder so dose reduced to 5mg qd   No other side effects of medication       Subjective      /80   Resp 16   Wt 73.5 kg (162 lb)   Breastfeeding No   BMI 31.64 kg/m²     BMI reviewed: Body mass index is 31.64 kg/m².      Objective     Physical Exam    Neuro: alert and oriented to person, place and time   General:  alert; cooperative; well developed; well nourished   Skin:  No suspicious lesions seen   Thyroid: normal to inspection and palpation   Lungs:  breathing is unlabored  clear to  auscultation bilaterally   Heart:  regular rate and rhythm, S1, S2 normal, no murmur, click, rub or gallop  normal apical impulse   Breasts:  Examined in supine position  Symmetric without masses or skin dimpling  Nipples normal without inversion, lesions or discharge  There are no palpable axillary nodes  s/p lumpectomy left breast   Abdomen: soft, non-tender; no masses  no umbilical or inguinal hernias are present  no hepato-splenomegaly   Pelvis: Clinical staff was present for exam  External genitalia:  normal appearance of the external genitalia including Bartholin's and [place]'s glands.  :  urethral meatus normal;  Vaginal:  atrophic mucosal changes are present;  Cervix:  absent.  Uterus:  absent.  Adnexa:  absent, bilateral.  Rectal:  digital rectal exam not performed; anus visually normal appearing.         Assessment / Plan      Assessment  Problems Addressed This Visit    ICD-10-CM ICD-9-CM   1. Encounter for gynecological examination with abnormal finding  Z01.411 V72.31   2. Urinary incontinence, mixed  N39.46 788.33   3. Personal history of breast cancer  Z85.3 V10.3   4. Osteopenia, senile  M85.80 733.90   5. Family history of breast cancer  Z80.3 V16.3   6. Family history of ovarian cancer  Z80.41 V16.41       Plan    After exam, c/o vulvar itching and irritation   She has been using Vagisil OTC which has been helpful until the last month or so   No vaginal c/o   Script for Nystatin ointment to pharmacy   Keep area as dry and clean as possible   Cont with Oxybutynin 5mg qd - refills to pharmacy     Reviewed DEXA report - f/u ordered   Discussed Calcium, 600 mg/ Vit. D, 400 IU daily; regular weight-bearing exercise    Discussed monthly SBEs and importance of annual imaging     Discussed vaginal atrophy - this is not bothersome to her   Will cont to monitor annually     AV 1 yr             Follow Up  Return in about 1 year (around 9/6/2023) for Annual physical.  Patient was given instructions and  counseling regarding her condition or for health maintenance advice. Please see specific information pulled into the AVS if appropriate.     Janelle Morales, APRN  September 6, 2022  10:34 EDT

## 2022-09-07 ENCOUNTER — TRANSCRIBE ORDERS (OUTPATIENT)
Dept: OBSTETRICS AND GYNECOLOGY | Facility: CLINIC | Age: 72
End: 2022-09-07

## 2022-09-07 DIAGNOSIS — R94.31 ABNORMAL EKG: ICD-10-CM

## 2022-09-07 DIAGNOSIS — Z12.31 VISIT FOR SCREENING MAMMOGRAM: Primary | ICD-10-CM

## 2022-09-07 DIAGNOSIS — R55 SYNCOPE, UNSPECIFIED SYNCOPE TYPE: ICD-10-CM

## 2022-09-07 DIAGNOSIS — R07.2 CHEST PAIN, PRECORDIAL: Primary | ICD-10-CM

## 2022-10-25 ENCOUNTER — TELEPHONE (OUTPATIENT)
Dept: CARDIOLOGY | Facility: CLINIC | Age: 72
End: 2022-10-25

## 2022-10-25 NOTE — TELEPHONE ENCOUNTER
Spoke with patient, advised of below  ----- Message from CHARO Ayers sent at 10/17/2022  1:22 PM EDT -----  Please let patient know her monitor was normal.  No arrhythmias noted.  ----- Message -----  From: Kiet Lombardo MD  Sent: 10/17/2022  11:06 AM EDT  To: CHARO Ayers

## 2022-10-28 ENCOUNTER — APPOINTMENT (OUTPATIENT)
Dept: BONE DENSITY | Facility: HOSPITAL | Age: 72
End: 2022-10-28

## 2022-12-12 ENCOUNTER — TELEPHONE (OUTPATIENT)
Dept: ONCOLOGY | Facility: CLINIC | Age: 72
End: 2022-12-12

## 2022-12-12 DIAGNOSIS — Z17.0 MALIGNANT NEOPLASM OF UPPER-INNER QUADRANT OF LEFT BREAST IN FEMALE, ESTROGEN RECEPTOR POSITIVE: ICD-10-CM

## 2022-12-12 DIAGNOSIS — C50.212 MALIGNANT NEOPLASM OF UPPER-INNER QUADRANT OF LEFT BREAST IN FEMALE, ESTROGEN RECEPTOR POSITIVE: ICD-10-CM

## 2022-12-12 RX ORDER — TAMOXIFEN CITRATE 20 MG/1
TABLET ORAL
Qty: 30 TABLET | Refills: 0 | Status: SHIPPED | OUTPATIENT
Start: 2022-12-12 | End: 2023-01-03 | Stop reason: SDUPTHER

## 2022-12-12 NOTE — TELEPHONE ENCOUNTER
Patient requested refills on tamoxifen.  Patient has not been seen 389/21.  Called and let patient know we can send in a 30 day supply.  I transferred to [place],  to make appt with Dr. Zavaleta.  Patient stated understanding.

## 2023-01-03 ENCOUNTER — OFFICE VISIT (OUTPATIENT)
Dept: ONCOLOGY | Facility: CLINIC | Age: 73
End: 2023-01-03
Payer: MEDICARE

## 2023-01-03 VITALS
TEMPERATURE: 97.5 F | SYSTOLIC BLOOD PRESSURE: 137 MMHG | BODY MASS INDEX: 32.2 KG/M2 | HEIGHT: 60 IN | RESPIRATION RATE: 16 BRPM | HEART RATE: 85 BPM | WEIGHT: 164 LBS | DIASTOLIC BLOOD PRESSURE: 71 MMHG | OXYGEN SATURATION: 98 %

## 2023-01-03 DIAGNOSIS — C50.212 MALIGNANT NEOPLASM OF UPPER-INNER QUADRANT OF LEFT BREAST IN FEMALE, ESTROGEN RECEPTOR POSITIVE: Primary | ICD-10-CM

## 2023-01-03 DIAGNOSIS — Z17.0 MALIGNANT NEOPLASM OF UPPER-INNER QUADRANT OF LEFT BREAST IN FEMALE, ESTROGEN RECEPTOR POSITIVE: Primary | ICD-10-CM

## 2023-01-03 PROCEDURE — 99213 OFFICE O/P EST LOW 20 MIN: CPT | Performed by: INTERNAL MEDICINE

## 2023-01-03 PROCEDURE — 1126F AMNT PAIN NOTED NONE PRSNT: CPT | Performed by: INTERNAL MEDICINE

## 2023-01-03 PROCEDURE — 1160F RVW MEDS BY RX/DR IN RCRD: CPT | Performed by: INTERNAL MEDICINE

## 2023-01-03 PROCEDURE — 1159F MED LIST DOCD IN RCRD: CPT | Performed by: INTERNAL MEDICINE

## 2023-01-03 RX ORDER — TAMOXIFEN CITRATE 20 MG/1
20 TABLET ORAL DAILY
Qty: 90 TABLET | Refills: 3 | Status: SHIPPED | OUTPATIENT
Start: 2023-01-03

## 2023-01-03 NOTE — PROGRESS NOTES
PROBLEM LIST:  1. eD3mO2E0 (Stage IA) ER+ WI+ Her2 negative invasive ductal carcinoma of the left breast  A) left lumpectomy on 11/8/18 showed a grade 2 IDC measuring 6 mm.  On 12/6/18 sentinel LN excision showed 0/2 nodes involved.  Adjuvant radiation completed 3/12/19.  Tamoxifen started April 2019.  2. Hypertension  3. Depression      Subjective     CHIEF COMPLAINT: Breast Cancer    HISTORY OF PRESENT ILLNESS:   Kayleen Holguin Nassar returns for follow-up.   She continues to take tamoxifen and says she is tolerating it well.  No new symptoms or complaints.  She reports that she had a few fainting spells.  She had a cardiac workup which was unremarkable.  Says she was diagnosed with neurocardiogenic syncope.      Objective      /71   Pulse 85   Temp 97.5 °F (36.4 °C) (Temporal)   Resp 16   Ht 152.4 cm (60\")   Wt 74.4 kg (164 lb)   SpO2 98%   BMI 32.03 kg/m²      Performance Status: 0    General: well appearing female in no acute distress  Neuro: alert and oriented  HEENT: sclera anicteric, oropharynx clear  Lymphatics: no cervical, supraclavicular, or axillary adenopathy  Breast: well healed incision upper inner quadrant of the left breast, left axillary incision.  No masses or lesions bilaterally  Cardiovascular: regular rate and rhythm, no murmurs  Lungs: clear to auscultation bilaterally  Abdomen: soft, nontender, nondistended.  No palpable organomegaly  Extremeties: no lower extremity edema  Skin: no rashes, lesions, bruising, or petechiae  Psych: mood and affect appropriate      RECENT LABS:        Lab Results   Component Value Date    HGB 12.9 10/22/2019    HCT 40.0 10/22/2019    MCV 93.2 10/22/2019     10/22/2019    WBC 6.53 10/22/2019    NEUTROABS 3.89 10/22/2019    LYMPHSABS 2.08 10/22/2019    MONOSABS 0.44 10/22/2019    EOSABS 0.03 10/22/2019    BASOSABS 0.07 10/22/2019     Lab Results   Component Value Date    GLUCOSE 101 (H) 10/22/2019    BUN 15 10/22/2019    CREATININE 0.98  10/22/2019     10/22/2019    K 4.2 10/22/2019     10/22/2019    CO2 22.2 10/22/2019    CALCIUM 9.2 10/22/2019    PROTEINTOT 7.4 10/22/2019    ALBUMIN 4.00 10/22/2019    BILITOT 0.2 10/22/2019    ALKPHOS 70 10/22/2019    AST 21 10/22/2019    ALT 13 10/22/2019           Assessment & Plan   Kayleen Nassar is a 72 y.o. year old female with a stage IA ER+ Her2 negative IDC of the left breast.      We will continue tamoxifen for a total of 5 years, or until April 2024.  Refill sent today.  She is doing well with no evidence of breast cancer recurrence.    Repeat mammogram scheduled per PCP in August.    Follow-up in 6 months.              Aida Zavaleta MD  [facility]    1/3/2023          CC:

## 2023-05-18 ENCOUNTER — OFFICE VISIT (OUTPATIENT)
Dept: NEUROSURGERY | Facility: CLINIC | Age: 73
End: 2023-05-18
Payer: MEDICARE

## 2023-05-18 VITALS
WEIGHT: 163 LBS | BODY MASS INDEX: 32 KG/M2 | DIASTOLIC BLOOD PRESSURE: 70 MMHG | HEIGHT: 60 IN | TEMPERATURE: 96.8 F | SYSTOLIC BLOOD PRESSURE: 136 MMHG

## 2023-05-18 DIAGNOSIS — M54.16 LUMBAR BACK PAIN WITH RADICULOPATHY AFFECTING LEFT LOWER EXTREMITY: Primary | ICD-10-CM

## 2023-05-18 RX ORDER — GABAPENTIN 300 MG/1
CAPSULE ORAL
Qty: 81 CAPSULE | Refills: 0 | Status: SHIPPED | OUTPATIENT
Start: 2023-05-18 | End: 2023-06-17

## 2023-05-18 RX ORDER — DICLOFENAC SODIUM 75 MG/1
75 TABLET, DELAYED RELEASE ORAL 2 TIMES DAILY
COMMUNITY

## 2023-05-18 NOTE — PROGRESS NOTES
Patient: Kayleen Nassar  : 1950    Primary Care Provider: Theo Anguiano MD    Requesting Provider: As above      Chief Complaint: Back Pain, Leg Pain (LLE), and Numbness (LLE into the toes)      History of Present Illness: This is a 72 y.o. female who has undergone 2 prior lumbar surgeries several years ago.  She presents with 3 months of back and left lower extremity pain.  She describes a tingling burning type pain that radiates down the lateral aspect of her leg to the foot.  She has noticed some weakness in foot dorsiflexion as well.  She does not have any significant right lower extremity symptoms.  She did find some benefit with intramuscular steroid.  She has not had any physical therapy at this point.    PMHX  Allergies:  No Known Allergies  Medications    Current Outpatient Medications:   •  aspirin 81 MG EC tablet, Take 1 tablet by mouth Daily., Disp: , Rfl:   •  bisoprolol-hydrochlorothiazide (ZIAC) 5-6.25 MG per tablet, , Disp: , Rfl:   •  diclofenac (VOLTAREN) 75 MG EC tablet, Take 1 tablet by mouth 2 (Two) Times a Day., Disp: , Rfl:   •  lisinopril (PRINIVIL,ZESTRIL) 10 MG tablet, , Disp: , Rfl:   •  nystatin (MYCOSTATIN) 759159 UNIT/GM ointment, Apply 1 application topically to the appropriate area as directed 2 (Two) Times a Day., Disp: 30 g, Rfl: 3  •  oxybutynin XL (DITROPAN-XL) 5 MG 24 hr tablet, Take 1 tablet by mouth Daily., Disp: 30 tablet, Rfl: 11  •  pantoprazole (PROTONIX) 40 MG EC tablet, , Disp: , Rfl:   •  pravastatin (PRAVACHOL) 20 MG tablet, Take 1 tablet by mouth Daily., Disp: , Rfl:   •  tamoxifen (NOLVADEX) 20 MG chemo tablet, Take 1 tablet by mouth Daily., Disp: 90 tablet, Rfl: 3  Past Medical History:  Past Medical History:   Diagnosis Date   • Adenomatous polyp of colon/ and 2009 2018   • Cervical disc disorder     Back surgery  and    • Chronic kidney disease    • Gout    • History of breast cancer 2018   • Hx of abdominal  hysterectomy, BSO 2001 04/03/2018   • Hx of radiation therapy    • Hyperlipidemia    • Hypertension    • Low back pain    • Lumbosacral disc disease    • Malignant neoplasm of upper-inner quadrant of left breast in female, estrogen receptor positive 01/28/2019   • Osteopenia determined by x-ray-2003 2005 2009 04/27/2018   • Sleep apnea      Past Surgical History:  Past Surgical History:   Procedure Laterality Date   • BACK SURGERY  2001 1991   • BREAST BIOPSY     • BREAST LUMPECTOMY Left 11/2018   • BREAST LUMPECTOMY Left    • COLONOSCOPY N/A 10/19/2021    Procedure: COLONOSCOPY FOR SCREENING POLYPECTOMY;  Surgeon: Cora Causey MD;  Location: [facility];  Service: Gastroenterology;  Laterality: N/A;   • TOTAL ABDOMINAL HYSTERECTOMY WITH SALPINGO OOPHORECTOMY Bilateral 2001     Social Hx:  Social History     Tobacco Use   • Smoking status: Never   • Smokeless tobacco: Never   Vaping Use   • Vaping Use: Never used   Substance Use Topics   • Alcohol use: No   • Drug use: No     Family Hx:  Family History   Problem Relation Age of Onset   • Osteoporosis Mother    • Arthritis Mother    • Hearing loss Mother    • Cancer Father    • COPD Father    • Heart disease Father    • Hyperlipidemia Father    • Breast cancer Sister 50   • Cancer Sister    • Ovarian cancer Sister 52   • Depression Sister    • Colon cancer Neg Hx    • Endometrial cancer Neg Hx      Review of Systems:        Review of Systems   Constitutional: Positive for fatigue. Negative for activity change, appetite change, chills, diaphoresis, fever and unexpected weight change.   HENT: Negative for congestion, dental problem, drooling, ear discharge, ear pain, facial swelling, hearing loss, mouth sores, nosebleeds, postnasal drip, rhinorrhea, sinus pressure, sneezing, sore throat, tinnitus, trouble swallowing and voice change.    Eyes: Negative for photophobia, pain, discharge, redness, itching and visual disturbance.   Respiratory: Positive for  "shortness of breath. Negative for apnea, cough, choking, chest tightness, wheezing and stridor.    Cardiovascular: Positive for leg swelling. Negative for chest pain and palpitations.   Gastrointestinal: Positive for abdominal distention and nausea. Negative for abdominal pain, anal bleeding, blood in stool, constipation, diarrhea, rectal pain and vomiting.   Endocrine: Negative for cold intolerance, heat intolerance, polydipsia, polyphagia and polyuria.   Genitourinary: Positive for flank pain. Negative for decreased urine volume, difficulty urinating, dysuria, enuresis, frequency, genital sores, hematuria and urgency.   Musculoskeletal: Positive for back pain, gait problem and joint swelling. Negative for arthralgias, myalgias, neck pain and neck stiffness.   Skin: Negative for color change, pallor, rash and wound.   Allergic/Immunologic: Negative for environmental allergies, food allergies and immunocompromised state.   Neurological: Positive for numbness and headaches. Negative for dizziness, tremors, seizures, syncope, facial asymmetry, speech difficulty, weakness and light-headedness.   Hematological: Negative for adenopathy. Does not bruise/bleed easily.   Psychiatric/Behavioral: Negative for agitation, behavioral problems, confusion, decreased concentration, dysphoric mood, hallucinations, self-injury, sleep disturbance and suicidal ideas. The patient is not nervous/anxious and is not hyperactive.    All other systems reviewed and are negative.       Physical Exam:   /70 (BP Location: Right arm, Patient Position: Sitting, Cuff Size: Adult)   Temp 96.8 °F (36 °C) (Infrared)   Ht 152.4 cm (60\")   Wt 73.9 kg (163 lb)   BMI 31.83 kg/m²   Awake, alert and oriented x 3  Speech f/c  Opens eyes spont  Pupils 3 mm rx bilaterally  Extraocular muscles intact bilaterally  Normal sensation to light touch in all 3 distributions of CN V bilaterally  Face symmetric bilaterally  Tongue midline  5/5 in all 4 ext " with exception of left dorsiflexion which is 4 out of 5    Diagnostic Studies:  All neurological imaging studies were independently reviewed unless stated otherwise    Assessment/Plan:  This is a 72 y.o. female who has undergone 2 prior lumbar spine surgeries who presents with new onset of back and left lower extremity pain.  Unfortunately, the patient's lumbar MRI is a very poor quality in terms of understanding any subtle nerve root compression that could explain her symptoms.  I am going to get a new lumbar MRI.  Additionally, I am going to obtain flexion-extension lumbar x-rays.  We are going to start the patient on gabapentin and she is going to begin physical therapy.  We will have her follow-up with me after she has undergone these diagnostic test to discuss the results.    Diagnoses and all orders for this visit:    1. Lumbar back pain with radiculopathy affecting left lower extremity (Primary)  -     MRI Lumbar Spine Without Contrast; Future  -     XR Spine Lumbar Flex & Ext; Future        Adam Angeles MD  05/18/23  10:47 EDT

## 2023-08-07 ENCOUNTER — OFFICE VISIT (OUTPATIENT)
Dept: CARDIOLOGY | Facility: CLINIC | Age: 73
End: 2023-08-07
Payer: MEDICARE

## 2023-08-07 VITALS
HEART RATE: 70 BPM | BODY MASS INDEX: 32.71 KG/M2 | OXYGEN SATURATION: 97 % | SYSTOLIC BLOOD PRESSURE: 110 MMHG | HEIGHT: 60 IN | DIASTOLIC BLOOD PRESSURE: 62 MMHG | WEIGHT: 166.6 LBS

## 2023-08-07 DIAGNOSIS — R55 NEUROCARDIOGENIC SYNCOPE: ICD-10-CM

## 2023-08-07 DIAGNOSIS — R07.2 CHEST PAIN, PRECORDIAL: Primary | ICD-10-CM

## 2023-08-07 DIAGNOSIS — R60.0 LOCALIZED EDEMA: ICD-10-CM

## 2023-08-07 PROCEDURE — 1160F RVW MEDS BY RX/DR IN RCRD: CPT | Performed by: NURSE PRACTITIONER

## 2023-08-07 PROCEDURE — 1159F MED LIST DOCD IN RCRD: CPT | Performed by: NURSE PRACTITIONER

## 2023-08-07 PROCEDURE — 99213 OFFICE O/P EST LOW 20 MIN: CPT | Performed by: NURSE PRACTITIONER

## 2023-08-07 NOTE — LETTER
August 7, 2023       No Recipients    Patient: Kayleen Nassar   YOB: 1950   Date of Visit: 8/7/2023     Dear Theo Anguiano MD:       Thank you for referring Kayleen Nassar to me for evaluation. Below are the relevant portions of my assessment and plan of care.    If you have questions, please do not hesitate to call me. I look forward to following Kayleen along with you.         Sincerely,        CHARO Ayers        CC:   No Recipients    Marivel Naidu APRN  08/07/23 1154  Sign when Signing Visit  Subjective:     Encounter Date:08/07/2023    Primary Care Physician: Theo Anguiano MD      Patient ID: Kayleen Nassar is a 72 y.o. female.    Chief Complaint:Chest Pain and Syncope    PROBLEM LIST:  Chest pain/dyspnea  8/2022 MPS negative for ischemia.  EF greater than 70%.  8/2022 echo EF 65%.  Mild MR.  Hypertension  Dyslipidemia  History of TIA symptoms  Neurocardiogenic syncope  Reportedly diagnosed 2005  Sleep apnea, no CPAP  History of breast cancer, left 2018  XRT x 15, no chemo.  Osteopenia  History of colon polyps  GERD  Multilevel degenerative disc disease.  Surgeries:  Back surgery  Breast biopsy and lumpectomy  Total hysterectomy         No Known Allergies      Current Outpatient Medications:     aspirin 81 MG EC tablet, Take 1 tablet by mouth Daily., Disp: , Rfl:     bisoprolol-hydrochlorothiazide (ZIAC) 5-6.25 MG per tablet, Take 1 tablet by mouth Daily., Disp: , Rfl:     diclofenac (VOLTAREN) 25 MG EC tablet, Take 1 tablet by mouth 2 (Two) Times a Day., Disp: , Rfl:     gabapentin (NEURONTIN) 300 MG capsule, Take 1 capsule by mouth 3 (Three) Times a Day., Disp: , Rfl:     lisinopril (PRINIVIL,ZESTRIL) 10 MG tablet, Take 1 tablet by mouth Daily., Disp: , Rfl:     nystatin (MYCOSTATIN) 711171 UNIT/GM ointment, Apply 1 application topically to the appropriate area as directed 2 (Two) Times a Day., Disp: 30 g, Rfl: 3    oxybutynin XL (DITROPAN-XL) 5 MG 24 hr tablet,  "Take 1 tablet by mouth Daily., Disp: 30 tablet, Rfl: 11    pantoprazole (PROTONIX) 40 MG EC tablet, Take 1 tablet by mouth., Disp: , Rfl:     pravastatin (PRAVACHOL) 20 MG tablet, Take 1 tablet by mouth Daily., Disp: , Rfl:     tamoxifen (NOLVADEX) 20 MG chemo tablet, Take 1 tablet by mouth Daily., Disp: 90 tablet, Rfl: 3        History of Present Illness    Patient is a 72-year-old  female who presents today for annual follow-up of neurocardiogenic syncope, chest pain and cardiac risk factors.  Since last being seen has been overall at her baseline.  Last year underwent myocardial perfusion study which was negative for ischemia.  Also had echocardiogram with no clear source for dyspnea.  Patient continues to have similar symptoms.  Has also been diagnosed with multilevel disc disease in her back and has significant discomfort related to this.  No reported syncope, near syncope.  Has been experiencing increased lower extremity edema.  Notes that she drinks lots of fluid and does cook with salt.    The following portions of the patient's history were reviewed and updated as appropriate: allergies, current medications, past family history, past medical history, past social history, past surgical history and problem list.      Social History     Tobacco Use    Smoking status: Never     Passive exposure: Never    Smokeless tobacco: Never   Vaping Use    Vaping Use: Never used   Substance Use Topics    Alcohol use: No    Drug use: No         ROS       Objective:   /62 (BP Location: Right arm, Patient Position: Sitting)   Pulse 70   Ht 152.4 cm (60\")   Wt 75.6 kg (166 lb 9.6 oz)   SpO2 97%   BMI 32.54 kg/mý         Vitals reviewed.   Constitutional:       Appearance: Well-developed and not in distress.   Neck:      Vascular: No JVD.      Trachea: No tracheal deviation.   Pulmonary:      Effort: Pulmonary effort is normal.      Breath sounds: Normal breath sounds.   Cardiovascular:      Normal " rate. Regular rhythm.      Murmurs: There is no murmur.   Edema:     Peripheral edema absent.   Musculoskeletal:         General: No deformity. Skin:     General: Skin is warm and dry.   Neurological:      Mental Status: Alert and oriented to person, place, and time.       Procedures          Assessment:   Assessment & Plan     Diagnoses and all orders for this visit:    1. Chest pain, precordial (Primary), unchanged.  Suspect possible spinal source.  MPS last year negative for ischemia.    2. Neurocardiogenic syncope, stable.  No syncope.    3. Localized edema, suspect related to some venous insufficiency as well as fluid and sodium intake.      Plan:  Discussed with patient suspect some of her worsened edema is related to volume intake and sodium intake.  Encouraged patient to decrease these.  If with significant change in volume and sodium intake she begins to experience hypotension would consider decreasing hypertensive burden starting with lisinopril.  Reviewed MPS and echo results with patient and family in the office today.  Patient is overall stable from cardiac standpoint.  We will attempt to obtain most recent lab work from primary care.  Follow-up in 1 years time or sooner if needed.       Marivel MANCILLA     Advance Care Planning  ACP discussion was held with the patient during this visit. Patient has an advance directive in EMR which is still valid.         Dictated utilizing Dragon dictation

## 2023-08-07 NOTE — PROGRESS NOTES
Subjective:     Encounter Date:08/07/2023    Primary Care Physician: Theo Anguiano MD      Patient ID: Kayleen Nassar is a 72 y.o. female.    Chief Complaint:Chest Pain and Syncope    PROBLEM LIST:  Chest pain/dyspnea  8/2022 MPS negative for ischemia.  EF greater than 70%.  8/2022 echo EF 65%.  Mild MRJerrica  Hypertension  Dyslipidemia  History of TIA symptoms  Neurocardiogenic syncope  Reportedly diagnosed 2005  Sleep apnea, no CPAP  History of breast cancer, left 2018  XRT x 15, no chemo.  Osteopenia  History of colon polyps  GERD  Multilevel degenerative disc disease.  Surgeries:  Back surgery  Breast biopsy and lumpectomy  Total hysterectomy         No Known Allergies      Current Outpatient Medications:     aspirin 81 MG EC tablet, Take 1 tablet by mouth Daily., Disp: , Rfl:     bisoprolol-hydrochlorothiazide (ZIAC) 5-6.25 MG per tablet, Take 1 tablet by mouth Daily., Disp: , Rfl:     diclofenac (VOLTAREN) 25 MG EC tablet, Take 1 tablet by mouth 2 (Two) Times a Day., Disp: , Rfl:     gabapentin (NEURONTIN) 300 MG capsule, Take 1 capsule by mouth 3 (Three) Times a Day., Disp: , Rfl:     lisinopril (PRINIVIL,ZESTRIL) 10 MG tablet, Take 1 tablet by mouth Daily., Disp: , Rfl:     nystatin (MYCOSTATIN) 914273 UNIT/GM ointment, Apply 1 application topically to the appropriate area as directed 2 (Two) Times a Day., Disp: 30 g, Rfl: 3    oxybutynin XL (DITROPAN-XL) 5 MG 24 hr tablet, Take 1 tablet by mouth Daily., Disp: 30 tablet, Rfl: 11    pantoprazole (PROTONIX) 40 MG EC tablet, Take 1 tablet by mouth., Disp: , Rfl:     pravastatin (PRAVACHOL) 20 MG tablet, Take 1 tablet by mouth Daily., Disp: , Rfl:     tamoxifen (NOLVADEX) 20 MG chemo tablet, Take 1 tablet by mouth Daily., Disp: 90 tablet, Rfl: 3        History of Present Illness    Patient is a 72-year-old  female who presents today for annual follow-up of neurocardiogenic syncope, chest pain and cardiac risk factors.  Since last being seen has been  "overall at her baseline.  Last year underwent myocardial perfusion study which was negative for ischemia.  Also had echocardiogram with no clear source for dyspnea.  Patient continues to have similar symptoms.  Has also been diagnosed with multilevel disc disease in her back and has significant discomfort related to this.  No reported syncope, near syncope.  Has been experiencing increased lower extremity edema.  Notes that she drinks lots of fluid and does cook with salt.    The following portions of the patient's history were reviewed and updated as appropriate: allergies, current medications, past family history, past medical history, past social history, past surgical history and problem list.      Social History     Tobacco Use    Smoking status: Never     Passive exposure: Never    Smokeless tobacco: Never   Vaping Use    Vaping Use: Never used   Substance Use Topics    Alcohol use: No    Drug use: No         ROS       Objective:   /62 (BP Location: Right arm, Patient Position: Sitting)   Pulse 70   Ht 152.4 cm (60\")   Wt 75.6 kg (166 lb 9.6 oz)   SpO2 97%   BMI 32.54 kg/mý         Vitals reviewed.   Constitutional:       Appearance: Well-developed and not in distress.   Neck:      Vascular: No JVD.      Trachea: No tracheal deviation.   Pulmonary:      Effort: Pulmonary effort is normal.      Breath sounds: Normal breath sounds.   Cardiovascular:      Normal rate. Regular rhythm.      Murmurs: There is no murmur.   Edema:     Peripheral edema absent.   Musculoskeletal:         General: No deformity. Skin:     General: Skin is warm and dry.   Neurological:      Mental Status: Alert and oriented to person, place, and time.       Procedures          Assessment:   Assessment & Plan      Diagnoses and all orders for this visit:    1. Chest pain, precordial (Primary), unchanged.  Suspect possible spinal source.  MPS last year negative for ischemia.    2. Neurocardiogenic syncope, stable.  No " syncope.    3. Localized edema, suspect related to some venous insufficiency as well as fluid and sodium intake.      Plan:  Discussed with patient suspect some of her worsened edema is related to volume intake and sodium intake.  Encouraged patient to decrease these.  If with significant change in volume and sodium intake she begins to experience hypotension would consider decreasing hypertensive burden starting with lisinopril.  Reviewed MPS and echo results with patient and family in the office today.  Patient is overall stable from cardiac standpoint.  We will attempt to obtain most recent lab work from primary care.  Follow-up in 1 years time or sooner if needed.       Marivel MANCILLA     Advance Care Planning   ACP discussion was held with the patient during this visit. Patient has an advance directive in EMR which is still valid.         Dictated utilizing Dragon dictation

## 2023-08-31 ENCOUNTER — HOSPITAL ENCOUNTER (OUTPATIENT)
Dept: MAMMOGRAPHY | Facility: HOSPITAL | Age: 73
Discharge: HOME | End: 2023-08-31
Admitting: NURSE PRACTITIONER
Payer: MEDICARE

## 2023-08-31 DIAGNOSIS — Z12.31 VISIT FOR SCREENING MAMMOGRAM: ICD-10-CM

## 2023-08-31 PROCEDURE — 77063 BREAST TOMOSYNTHESIS BI: CPT

## 2023-08-31 PROCEDURE — 77067 SCR MAMMO BI INCL CAD: CPT

## 2023-12-29 DIAGNOSIS — C50.212 MALIGNANT NEOPLASM OF UPPER-INNER QUADRANT OF LEFT BREAST IN FEMALE, ESTROGEN RECEPTOR POSITIVE: ICD-10-CM

## 2023-12-29 DIAGNOSIS — Z17.0 MALIGNANT NEOPLASM OF UPPER-INNER QUADRANT OF LEFT BREAST IN FEMALE, ESTROGEN RECEPTOR POSITIVE: ICD-10-CM

## 2024-01-08 ENCOUNTER — TELEPHONE (OUTPATIENT)
Dept: ONCOLOGY | Facility: CLINIC | Age: 74
End: 2024-01-08

## 2024-01-08 NOTE — TELEPHONE ENCOUNTER
Caller: Kayleen Nassar    Relationship to patient: Self    Best call back number: [phone]     Chief complaint: R/S TOMORROW'S APPT IN [place]    Type of visit: FOLLOW UP 1    Requested date: PLEASE CALL PT TO R/S, HUB UNABLE TO WARM TRANSFER.    If rescheduling, when is the original appointment: TOMORROW, 1/9.  THERE HAS BEEN A DEATH IN THE FAMILY, PT NEEDS TO R/S.

## 2024-01-23 ENCOUNTER — OFFICE VISIT (OUTPATIENT)
Dept: ONCOLOGY | Facility: CLINIC | Age: 74
End: 2024-01-23
Payer: MEDICARE

## 2024-01-23 VITALS
TEMPERATURE: 97.1 F | WEIGHT: 157 LBS | RESPIRATION RATE: 12 BRPM | BODY MASS INDEX: 30.82 KG/M2 | OXYGEN SATURATION: 98 % | SYSTOLIC BLOOD PRESSURE: 136 MMHG | HEART RATE: 77 BPM | DIASTOLIC BLOOD PRESSURE: 63 MMHG | HEIGHT: 60 IN

## 2024-01-23 DIAGNOSIS — Z12.31 ENCOUNTER FOR SCREENING MAMMOGRAM FOR MALIGNANT NEOPLASM OF BREAST: Primary | ICD-10-CM

## 2024-01-23 DIAGNOSIS — C50.212 MALIGNANT NEOPLASM OF UPPER-INNER QUADRANT OF LEFT BREAST IN FEMALE, ESTROGEN RECEPTOR POSITIVE: ICD-10-CM

## 2024-01-23 DIAGNOSIS — Z17.0 MALIGNANT NEOPLASM OF UPPER-INNER QUADRANT OF LEFT BREAST IN FEMALE, ESTROGEN RECEPTOR POSITIVE: ICD-10-CM

## 2024-01-23 PROCEDURE — 99213 OFFICE O/P EST LOW 20 MIN: CPT | Performed by: INTERNAL MEDICINE

## 2024-01-23 PROCEDURE — 1126F AMNT PAIN NOTED NONE PRSNT: CPT | Performed by: INTERNAL MEDICINE

## 2024-01-23 RX ORDER — TAMOXIFEN CITRATE 20 MG/1
20 TABLET ORAL DAILY
Qty: 90 TABLET | Refills: 0 | Status: SHIPPED | OUTPATIENT
Start: 2024-01-23

## 2024-01-23 RX ORDER — TAMOXIFEN CITRATE 20 MG/1
20 TABLET ORAL DAILY
Qty: 90 TABLET | Refills: 0 | OUTPATIENT
Start: 2024-01-23

## 2024-01-23 NOTE — PROGRESS NOTES
"      PROBLEM LIST:  1. [id number] (Stage IA) ER+ MI+ Her2 negative invasive ductal carcinoma of the left breast  A) left lumpectomy on 11/8/18 showed a grade 2 IDC measuring 6 mm.  On 12/6/18 sentinel LN excision showed 0/2 nodes involved.    B.)Adjuvant radiation completed 3/12/19.   C.) Tamoxifen started April 2019.  2. Hypertension  3. Depression      Subjective     CHIEF COMPLAINT: Breast Cancer    HISTORY OF PRESENT ILLNESS:   Kayleen Holguin Nassar returns for follow-up.  She says she has been feeling well.  No new symptoms or concerns.  She continues to take tamoxifen.         Objective      /63   Pulse 77   Temp 97.1 °F (36.2 °C)   Resp 12   Ht 152.4 cm (60\")   Wt 71.2 kg (157 lb)   SpO2 98%   BMI 30.66 kg/m²      Performance Status: 0    General: well appearing female in no acute distress  Neuro: alert and oriented  HEENT: sclera anicteric, oropharynx clear  Lymphatics: no cervical, supraclavicular, or axillary adenopathy  Breast: well healed incision upper inner quadrant of the left breast, left axillary incision.  No masses or lesions bilaterally  Abdomen: soft, nontender, nondistended.  No palpable organomegaly  Extremeties: no lower extremity edema  Skin: no rashes, lesions, bruising, or petechiae  Psych: mood and affect appropriate      RECENT LABS:        Lab Results   Component Value Date    HGB 12.9 10/22/2019    HCT 40.0 10/22/2019    MCV 93.2 10/22/2019     10/22/2019    WBC 6.53 10/22/2019    NEUTROABS 3.89 10/22/2019    LYMPHSABS 2.08 10/22/2019    MONOSABS 0.44 10/22/2019    EOSABS 0.03 10/22/2019    BASOSABS 0.07 10/22/2019     Lab Results   Component Value Date    GLUCOSE 101 (H) 10/22/2019    BUN 15 10/22/2019    CREATININE 0.98 10/22/2019     10/22/2019    K 4.2 10/22/2019     10/22/2019    CO2 22.2 10/22/2019    CALCIUM 9.2 10/22/2019    PROTEINTOT 7.4 10/22/2019    ALBUMIN 4.00 10/22/2019    BILITOT 0.2 10/22/2019    ALKPHOS 70 10/22/2019    AST 21 10/22/2019 "    ALT 13 10/22/2019           Assessment & Plan   Kayleen Nassar is a 73 y.o. year old female with a stage IA ER+ Her2 negative IDC of the left breast.      We will continue tamoxifen for a total of 5 years which she will complete in April 2024.  I recommend that she stop tamoxifen at that time.  She would like to continue yearly follow-up with the nurse practitioner for ongoing monitoring/survivorship.    Order placed for her screening mammogram in August.    Follow-up in 1 year.              Aida Zavaleta MD  [facility]    1/23/2024          CC:

## 2024-08-12 ENCOUNTER — OFFICE VISIT (OUTPATIENT)
Dept: CARDIOLOGY | Facility: CLINIC | Age: 74
End: 2024-08-12
Payer: MEDICARE

## 2024-08-12 VITALS
SYSTOLIC BLOOD PRESSURE: 134 MMHG | WEIGHT: 163.2 LBS | BODY MASS INDEX: 32.04 KG/M2 | DIASTOLIC BLOOD PRESSURE: 74 MMHG | HEART RATE: 88 BPM | OXYGEN SATURATION: 94 % | HEIGHT: 60 IN

## 2024-08-12 DIAGNOSIS — R07.2 PRECORDIAL CHEST PAIN: Primary | ICD-10-CM

## 2024-08-12 DIAGNOSIS — I10 ESSENTIAL HYPERTENSION: ICD-10-CM

## 2024-08-12 DIAGNOSIS — E78.5 DYSLIPIDEMIA: ICD-10-CM

## 2024-08-12 PROCEDURE — 3078F DIAST BP <80 MM HG: CPT | Performed by: INTERNAL MEDICINE

## 2024-08-12 PROCEDURE — 3075F SYST BP GE 130 - 139MM HG: CPT | Performed by: INTERNAL MEDICINE

## 2024-08-12 PROCEDURE — 1160F RVW MEDS BY RX/DR IN RCRD: CPT | Performed by: INTERNAL MEDICINE

## 2024-08-12 PROCEDURE — 93000 ELECTROCARDIOGRAM COMPLETE: CPT | Performed by: INTERNAL MEDICINE

## 2024-08-12 PROCEDURE — 99214 OFFICE O/P EST MOD 30 MIN: CPT | Performed by: INTERNAL MEDICINE

## 2024-08-12 PROCEDURE — 1159F MED LIST DOCD IN RCRD: CPT | Performed by: INTERNAL MEDICINE

## 2024-08-12 NOTE — PROGRESS NOTES
[facility] Cardiology  Subjective:     Encounter Date: 08/12/2024      Patient ID: Kayleen Nassar is a 73 y.o. female.    Chief Complaint: Chest pain, precordial      PROBLEM LIST:  Chest pain/dyspnea  8/2022 MPS negative for ischemia.  EF greater than 70%.  8/2022 echo EF 65%.  Mild MR. LEVIN, 09/07/2022: No arrhythmias.   Hypertension  Dyslipidemia  History of TIA symptoms  Neurocardiogenic syncope  Reportedly diagnosed 2005  Sleep apnea, no CPAP  History of breast cancer, left 2018  XRT x 15, no chemo.  Osteopenia  History of colon polyps  GERD  Multilevel degenerative disc disease.  Surgeries:  Back surgery  Breast biopsy and lumpectomy  Total hysterectomy       History of Present Illness  Kayleen Nassar returns today for a 1 year follow up with a history of chest pain and cardiac risk factors. Since last visit, patient has been doing well overall from a cardiovascular standpoint. She has not had any recent hospital admissions or ED visits. Patient notes that she is frequently fatigued and has had several episodes of syncope. Prior to fainting, she sometimes feels numbness in her arms and hands. She has discontinued using pravastatin due to myalgias in her legs. Patient has discontinued all medications except Ziac and lisinopril. She denies chest pain, shortness of breath, orthopnea, palpitations, and edema.    No Known Allergies      Current Outpatient Medications:     bisoprolol-hydrochlorothiazide (ZIAC) 5-6.25 MG per tablet, Take 1 tablet by mouth Daily., Disp: , Rfl:     lisinopril (PRINIVIL,ZESTRIL) 10 MG tablet, Take 1 tablet by mouth Daily., Disp: , Rfl:     The following portions of the patient's history were reviewed and updated as appropriate: allergies, current medications, past family history, past medical history, past social history, past surgical history and problem list.    ROS       Objective:     Vitals:    08/12/24 1112   BP: 134/74   Pulse: 88  "  SpO2: 94%   Weight: 74 kg (163 lb 3.2 oz)   Height: 152.4 cm (60\")         Vitals reviewed.   Constitutional:       Appearance: Well-developed and not in distress.   Neck:      Thyroid: No thyromegaly.      Vascular: No carotid bruit or JVD.   Pulmonary:      Breath sounds: Normal breath sounds.   Cardiovascular:      Regular rhythm.      No gallop. No S3 and S4 gallop.   Pulses:     Intact distal pulses.      Carotid: 2+ bilaterally.     Radial: 2+ bilaterally.  Edema:     Peripheral edema absent.   Abdominal:      General: Bowel sounds are normal.      Palpations: Abdomen is soft. There is no abdominal mass.      Tenderness: There is no abdominal tenderness.   Musculoskeletal:         General: No deformity.      Extremities: No clubbing present.Skin:     General: Skin is warm and dry.      Findings: No rash.   Neurological:      Mental Status: Alert and oriented to person, place, and time.         Lab Review:  No recent laboratory studies available for review today.      ECG 12 Lead    Date/Time: 8/12/2024 11:32 AM  Performed by: Kiet Lombardo MD    Authorized by: Kiet Lombardo MD  Comparison: compared with previous ECG from 8/1/2022  Similar to previous ECG  Rhythm: sinus rhythm  BPM: 83    Clinical impression: normal ECG            Advance Care Planning   ACP discussion was held with the patient during this visit. Patient has an advance directive (not in EMR), copy requested.           Assessment:   Diagnoses and all orders for this visit:    1. Precordial chest pain (Primary)    2. Essential hypertension    3. Dyslipidemia    Other orders  -     ECG 12 Lead        Impression:  1. Precordial chest pain (Primary). No recent episodes.     2. Essential hypertension. Well controlled. Continue on bisoprolol HCTZ 5-6.25 mg daily for rate control and hypertension. Continue on lisinopril 10 mg daily for hypertension.     3. Dyslipidemia. No labs for review.  Patient discontinued her statin due to myalgias.  " Unclear if this was the the cause of her symptoms, but reluctant to retry.    4.  Neurocardiogenic syncope.  Still intermittent episodes several times annually.  Unchanged.  Patient not particularly distressed by these.    Plan:  Stable cardiac status.   Advised patient to resume 81 mg of aspirin due to past history of TIA.   Continue current medications.  Would consider retrying statin in the future.  Will discuss with primary physician.    Revisit in 12 MO, or sooner as needed.          Scribed for Kiet Lombardo MD by Mani Mario. 8/12/2024 11:33 EDT  Kiet Lombardo MD    Please note that portions of this note may have been completed with a voice recognition program. Efforts were made to edit the dictations, but occasionally words are mistranscribed.

## 2024-09-03 ENCOUNTER — HOSPITAL ENCOUNTER (OUTPATIENT)
Dept: MAMMOGRAPHY | Facility: HOSPITAL | Age: 74
Discharge: HOME | End: 2024-09-03
Admitting: INTERNAL MEDICINE
Payer: MEDICARE

## 2024-09-03 DIAGNOSIS — Z12.31 ENCOUNTER FOR SCREENING MAMMOGRAM FOR MALIGNANT NEOPLASM OF BREAST: ICD-10-CM

## 2024-09-03 PROCEDURE — 77063 BREAST TOMOSYNTHESIS BI: CPT

## 2024-09-03 PROCEDURE — 77067 SCR MAMMO BI INCL CAD: CPT

## 2024-09-27 ENCOUNTER — TELEPHONE (OUTPATIENT)
Dept: ONCOLOGY | Facility: CLINIC | Age: 74
End: 2024-09-27
Payer: MEDICARE

## 2025-02-11 ENCOUNTER — OFFICE VISIT (OUTPATIENT)
Dept: ONCOLOGY | Facility: CLINIC | Age: 75
End: 2025-02-11
Payer: MEDICARE

## 2025-02-11 VITALS
DIASTOLIC BLOOD PRESSURE: 62 MMHG | RESPIRATION RATE: 16 BRPM | TEMPERATURE: 98 F | HEIGHT: 60 IN | OXYGEN SATURATION: 96 % | WEIGHT: 162.7 LBS | BODY MASS INDEX: 31.94 KG/M2 | HEART RATE: 85 BPM | SYSTOLIC BLOOD PRESSURE: 118 MMHG

## 2025-02-11 DIAGNOSIS — Z12.31 ENCOUNTER FOR SCREENING MAMMOGRAM FOR MALIGNANT NEOPLASM OF BREAST: Primary | ICD-10-CM

## 2025-02-11 RX ORDER — GABAPENTIN 300 MG/1
1 CAPSULE ORAL 3 TIMES DAILY
COMMUNITY

## 2025-02-11 RX ORDER — ALLOPURINOL 100 MG/1
1 TABLET ORAL DAILY
COMMUNITY

## 2025-02-11 RX ORDER — PANTOPRAZOLE SODIUM 40 MG/1
TABLET, DELAYED RELEASE ORAL
COMMUNITY

## 2025-02-11 RX ORDER — OXYBUTYNIN CHLORIDE 5 MG/1
5 TABLET ORAL DAILY
COMMUNITY

## 2025-02-11 NOTE — PROGRESS NOTES
"      PROBLEM LIST:  1. [id number] (Stage IA) ER+ MA+ Her2 negative invasive ductal carcinoma of the left breast  A) left lumpectomy on 11/8/18 showed a grade 2 IDC measuring 6 mm.  On 12/6/18 sentinel LN excision showed 0/2 nodes involved.    B.)Adjuvant radiation completed 3/12/19.   C.) Tamoxifen started April 2019.  Completed April 2024  2. Hypertension  3. Depression      Subjective     CHIEF COMPLAINT: Breast Cancer    HISTORY OF PRESENT ILLNESS:   Kayleen Laughlinpard returns for follow-up.  Overall, she has been doing well.  No new symptoms or concerns.  She completed tamoxifen in April 2024.  She had her mammogram in September but due to the pain that it caused an experience that she had she declined further imaging studies at that time.  No new changes or updates in her medical health history.       Objective      /62   Pulse 85   Temp 98 °F (36.7 °C)   Resp 16   Ht 152.4 cm (60\")   Wt 73.8 kg (162 lb 11.2 oz)   SpO2 96%   BMI 31.78 kg/m²      Performance Status: 0    General: well appearing female in no acute distress  Neuro: alert and oriented  HEENT: sclera anicteric, oropharynx clear  Lymphatics: no cervical, supraclavicular, or axillary adenopathy  Cardiovascular: regular rate and rhythm, no murmurs  Lungs: clear to auscultation bilaterally  Abdomen: soft, nontender, nondistended.  No palpable organomegaly  Extremeties: no lower extremity edema  Skin: no rashes, lesions, bruising, or petechiae  Psych: mood and affect appropriate  Breast: Well-healed incision upper inner quadrant of the left breast, left axillary incision.  No masses or lesions bilaterally.            RECENT LABS:        Lab Results   Component Value Date    HGB 12.9 10/22/2019    HCT 40.0 10/22/2019    MCV 93.2 10/22/2019     10/22/2019    WBC 6.53 10/22/2019    NEUTROABS 3.89 10/22/2019    LYMPHSABS 2.08 10/22/2019    MONOSABS 0.44 10/22/2019    EOSABS 0.03 10/22/2019    BASOSABS 0.07 10/22/2019     Lab Results "   Component Value Date    GLUCOSE 101 (H) 10/22/2019    BUN 15 10/22/2019    CREATININE 0.98 10/22/2019     10/22/2019    K 4.2 10/22/2019     10/22/2019    CO2 22.2 10/22/2019    CALCIUM 9.2 10/22/2019    PROTEINTOT 7.4 10/22/2019    ALBUMIN 4.00 10/22/2019    BILITOT 0.2 10/22/2019    ALKPHOS 70 10/22/2019    AST 21 10/22/2019    ALT 13 10/22/2019           Assessment & Plan   Kayleen Nassar is a 74 y.o. year old female with a stage IA ER+ Her2 negative IDC of the left breast.      We will continue Tauke tamoxifen for a total of 5 years which will complete pleat in April 2024.  She stopped tamoxifen at that time.  She would like to continue yearly follow-up for ongoing monitoring/survivorship.    We had a fairly in-depth conversation about her additional imaging. We discussed we do recommend additional imaging's for a complete mammogram.  She declines further imaging at this time.  She understands the risk of not completing the full mammogram.  She had a fairly painful experience and is not ready to repeat her mammogram.   I will order screening mammogram for September 2025.      Follow-up in 1 year        Total time of patient care including time prior to, face to face with patient, and following visit spent in reviewing records, lab results, imaging studies, discussion with patient, and documentation/charting was > 31 minutes        Isi Gonzalez APRN  [facility]    2/11/2025          CC:

## 2025-08-01 ENCOUNTER — TELEPHONE (OUTPATIENT)
Dept: ONCOLOGY | Facility: CLINIC | Age: 75
End: 2025-08-01
Payer: MEDICARE

## 2025-08-04 DIAGNOSIS — C50.212 MALIGNANT NEOPLASM OF UPPER-INNER QUADRANT OF LEFT BREAST IN FEMALE, ESTROGEN RECEPTOR POSITIVE: Primary | ICD-10-CM

## 2025-08-04 DIAGNOSIS — Z17.0 MALIGNANT NEOPLASM OF UPPER-INNER QUADRANT OF LEFT BREAST IN FEMALE, ESTROGEN RECEPTOR POSITIVE: Primary | ICD-10-CM

## (undated) DEVICE — GOWN,REINF,POLY,ECL,PP SLV,XL: Brand: MEDLINE

## (undated) DEVICE — ENDOGATOR AUXILIARY WATER JET CONNECTOR: Brand: ENDOGATOR

## (undated) DEVICE — SYR LUERLOK 30CC

## (undated) DEVICE — THE SINGLE USE ETRAP – POLYP TRAP IS USED FOR SUCTION RETRIEVAL OF ENDOSCOPICALLY REMOVED POLYPS.: Brand: ETRAP

## (undated) DEVICE — Device

## (undated) DEVICE — SUCTION CANISTER, 1500CC, RIGID: Brand: DEROYAL

## (undated) DEVICE — Device: Brand: DEFENDO AIR/WATER/SUCTION AND BIOPSY VALVE

## (undated) DEVICE — CONN Y IRR DISP 1P/U

## (undated) DEVICE — SNAR POLYP CAPTIFLX MICRO OVL 13MM 240CM